# Patient Record
Sex: MALE | Race: WHITE | Employment: FULL TIME
[De-identification: names, ages, dates, MRNs, and addresses within clinical notes are randomized per-mention and may not be internally consistent; named-entity substitution may affect disease eponyms.]

---

## 2017-02-24 ENCOUNTER — OFFICE VISIT (OUTPATIENT)
Dept: FAMILY MEDICINE CLINIC | Facility: CLINIC | Age: 42
End: 2017-02-24

## 2017-02-24 VITALS
HEART RATE: 84 BPM | OXYGEN SATURATION: 100 % | HEIGHT: 72 IN | TEMPERATURE: 98.2 F | SYSTOLIC BLOOD PRESSURE: 145 MMHG | RESPIRATION RATE: 18 BRPM | BODY MASS INDEX: 31.29 KG/M2 | WEIGHT: 231 LBS | DIASTOLIC BLOOD PRESSURE: 100 MMHG

## 2017-02-24 DIAGNOSIS — K44.9 HIATAL HERNIA: ICD-10-CM

## 2017-02-24 DIAGNOSIS — I10 ESSENTIAL HYPERTENSION: Primary | ICD-10-CM

## 2017-02-24 DIAGNOSIS — M79.10 MYALGIA: ICD-10-CM

## 2017-02-24 DIAGNOSIS — Z00.00 HEALTHCARE MAINTENANCE: ICD-10-CM

## 2017-02-24 PROCEDURE — 99213 OFFICE O/P EST LOW 20 MIN: CPT | Performed by: STUDENT IN AN ORGANIZED HEALTH CARE EDUCATION/TRAINING PROGRAM

## 2017-02-24 RX ORDER — HYDROCHLOROTHIAZIDE 25 MG/1
25 TABLET ORAL DAILY
COMMUNITY
End: 2017-02-24 | Stop reason: SDUPTHER

## 2017-02-24 RX ORDER — OMEPRAZOLE 20 MG/1
20 CAPSULE, DELAYED RELEASE ORAL 2 TIMES DAILY
COMMUNITY
End: 2017-02-24 | Stop reason: SDUPTHER

## 2017-02-24 RX ORDER — ACETAMINOPHEN 325 MG/1
325 TABLET ORAL EVERY 6 HOURS PRN
Qty: 120 TABLET | Refills: 3 | Status: SHIPPED | OUTPATIENT
Start: 2017-02-24 | End: 2017-05-19 | Stop reason: ALTCHOICE

## 2017-02-24 RX ORDER — HYDROCHLOROTHIAZIDE 25 MG/1
25 TABLET ORAL DAILY
Qty: 30 TABLET | Refills: 3 | Status: SHIPPED | OUTPATIENT
Start: 2017-02-24 | End: 2017-06-15 | Stop reason: SDUPTHER

## 2017-02-24 RX ORDER — OMEPRAZOLE 20 MG/1
20 CAPSULE, DELAYED RELEASE ORAL 2 TIMES DAILY
Qty: 30 CAPSULE | Refills: 2 | Status: SHIPPED | OUTPATIENT
Start: 2017-02-24 | End: 2018-01-10 | Stop reason: SDUPTHER

## 2017-02-24 ASSESSMENT — PATIENT HEALTH QUESTIONNAIRE - PHQ9
SUM OF ALL RESPONSES TO PHQ QUESTIONS 1-9: 0
2. FEELING DOWN, DEPRESSED OR HOPELESS: 0
SUM OF ALL RESPONSES TO PHQ9 QUESTIONS 1 & 2: 0
1. LITTLE INTEREST OR PLEASURE IN DOING THINGS: 0

## 2017-02-24 ASSESSMENT — ENCOUNTER SYMPTOMS
SHORTNESS OF BREATH: 0
ABDOMINAL PAIN: 0
CONSTIPATION: 0
DIARRHEA: 0
WHEEZING: 0

## 2017-03-15 ENCOUNTER — TELEPHONE (OUTPATIENT)
Dept: FAMILY MEDICINE CLINIC | Age: 42
End: 2017-03-15

## 2017-03-15 ENCOUNTER — EMPLOYEE WELLNESS (OUTPATIENT)
Dept: OTHER | Age: 42
End: 2017-03-15

## 2017-03-15 LAB
CHOLESTEROL/HDL RATIO: 4.9
CHOLESTEROL: 191 MG/DL
GLUCOSE BLD-MCNC: 97 MG/DL (ref 70–99)
HDLC SERPL-MCNC: 39 MG/DL
LDL CHOLESTEROL: 104 MG/DL (ref 0–130)
PATIENT FASTING?: YES
TRIGL SERPL-MCNC: 239 MG/DL
VLDLC SERPL CALC-MCNC: ABNORMAL MG/DL (ref 1–30)

## 2017-03-23 ENCOUNTER — TELEPHONE (OUTPATIENT)
Dept: FAMILY MEDICINE CLINIC | Age: 42
End: 2017-03-23

## 2017-05-15 ENCOUNTER — TELEPHONE (OUTPATIENT)
Dept: FAMILY MEDICINE CLINIC | Age: 42
End: 2017-05-15

## 2017-05-15 NOTE — TELEPHONE ENCOUNTER
Called pt to f/u with him about your message to him about his f/u appt, seen that he have another new patient appt in [facility], just wanted you to know thanks writer.

## 2017-05-19 ENCOUNTER — OFFICE VISIT (OUTPATIENT)
Dept: FAMILY MEDICINE CLINIC | Age: 42
End: 2017-05-19
Payer: COMMERCIAL

## 2017-05-19 VITALS
DIASTOLIC BLOOD PRESSURE: 86 MMHG | BODY MASS INDEX: 33.64 KG/M2 | RESPIRATION RATE: 16 BRPM | SYSTOLIC BLOOD PRESSURE: 135 MMHG | WEIGHT: 235 LBS | HEART RATE: 62 BPM | HEIGHT: 70 IN

## 2017-05-19 DIAGNOSIS — M72.2 PLANTAR FASCIITIS: ICD-10-CM

## 2017-05-19 DIAGNOSIS — E78.1 HIGH TRIGLYCERIDES: ICD-10-CM

## 2017-05-19 DIAGNOSIS — I10 ESSENTIAL HYPERTENSION: Primary | ICD-10-CM

## 2017-05-19 DIAGNOSIS — E78.6 LOW HDL (UNDER 40): ICD-10-CM

## 2017-05-19 DIAGNOSIS — K21.9 GASTROESOPHAGEAL REFLUX DISEASE, ESOPHAGITIS PRESENCE NOT SPECIFIED: ICD-10-CM

## 2017-05-19 PROCEDURE — 99214 OFFICE O/P EST MOD 30 MIN: CPT | Performed by: FAMILY MEDICINE

## 2017-06-15 DIAGNOSIS — I10 ESSENTIAL HYPERTENSION: ICD-10-CM

## 2017-06-15 RX ORDER — HYDROCHLOROTHIAZIDE 25 MG/1
25 TABLET ORAL DAILY
Qty: 30 TABLET | Refills: 5 | Status: SHIPPED | OUTPATIENT
Start: 2017-06-15 | End: 2018-01-10 | Stop reason: SDUPTHER

## 2017-07-25 ENCOUNTER — TELEPHONE (OUTPATIENT)
Dept: PHARMACY | Age: 42
End: 2017-07-25

## 2017-12-26 ENCOUNTER — PATIENT MESSAGE (OUTPATIENT)
Dept: FAMILY MEDICINE CLINIC | Age: 42
End: 2017-12-26

## 2017-12-26 DIAGNOSIS — K21.9 GASTROESOPHAGEAL REFLUX DISEASE, ESOPHAGITIS PRESENCE NOT SPECIFIED: Primary | ICD-10-CM

## 2018-01-08 ENCOUNTER — OFFICE VISIT (OUTPATIENT)
Dept: GASTROENTEROLOGY | Age: 43
End: 2018-01-08
Payer: COMMERCIAL

## 2018-01-08 VITALS
DIASTOLIC BLOOD PRESSURE: 94 MMHG | WEIGHT: 240 LBS | OXYGEN SATURATION: 97 % | RESPIRATION RATE: 14 BRPM | HEIGHT: 72 IN | TEMPERATURE: 97.3 F | BODY MASS INDEX: 32.51 KG/M2 | HEART RATE: 71 BPM | SYSTOLIC BLOOD PRESSURE: 140 MMHG

## 2018-01-08 DIAGNOSIS — Z80.0 FAMILY HISTORY OF COLON CANCER IN MOTHER: ICD-10-CM

## 2018-01-08 DIAGNOSIS — K21.9 GASTROESOPHAGEAL REFLUX DISEASE WITHOUT ESOPHAGITIS: Primary | ICD-10-CM

## 2018-01-08 PROCEDURE — 99244 OFF/OP CNSLTJ NEW/EST MOD 40: CPT | Performed by: INTERNAL MEDICINE

## 2018-01-08 RX ORDER — SODIUM, POTASSIUM,MAG SULFATES 17.5-3.13G
SOLUTION, RECONSTITUTED, ORAL ORAL
Qty: 1 BOTTLE | Refills: 0 | Status: SHIPPED | OUTPATIENT
Start: 2018-01-08 | End: 2018-03-01

## 2018-01-08 ASSESSMENT — ENCOUNTER SYMPTOMS
DIARRHEA: 0
VOMITING: 0
ANAL BLEEDING: 0
RESPIRATORY NEGATIVE: 1
BLOOD IN STOOL: 0
GASTROINTESTINAL NEGATIVE: 1
ABDOMINAL PAIN: 0
ALLERGIC/IMMUNOLOGIC NEGATIVE: 1
RECTAL PAIN: 0
ABDOMINAL DISTENTION: 0
WHEEZING: 0
CONSTIPATION: 0
BACK PAIN: 0
SHORTNESS OF BREATH: 0
NAUSEA: 0
COUGH: 0

## 2018-01-08 NOTE — PROGRESS NOTES
Subjective:      Patient ID: Serjio Gonzalez is a 38 y.o. male. HPI  Dr. Clint Root MD has requested that I see Serjio Gonzalez for a consult for   1. Gastroesophageal reflux disease without esophagitis    2. Family history of colon cancer in mother     . Patient is here for the first time, Gastroesophageal Reflux (pt has history of hiatal hernia and has hx of stomach polyps, he has also aspirated in the past,  doing well recently with watching diet and does not eat late at night ) and GI Problem (pt is adopted but recently found his biological mother's side of the family and found that several family members have had cancer with his mother dying from colon cancer at the age of 43, pt has had 3 colonoscopies in the past starting at the age of 25 and all have been normal. medical records have been requested )  here for the first time   hx of GERD been followed with another GI in his town , eg/colon last time 4 years ago   Was told H H , and gastric polyps from PPI,(MOST [address] )   Very significant F Hx of colon ca in mother and MGM before age 34 , had colonoscopy at age 27-29-38 all negative   Now he moved here and working for [employer] , and wants to start seeing  us   On PPI prilosec BID that is controlling the symptoms well   No N/v   no abd pain   no melena/hematemsis/hematochezia  No dysphagia/odynophagia   no wt loss   no diarrhea /contipation      Past Medical, Family, and Social History reviewed and does contribute to the patient presenting condition. patient\"s PMH/PSH,SH,PSYCH hx, MEDs, ALLERGIES, and ROS was all reviewed and updated ion the appropriate sections    Review of Systems   Constitutional: Negative. Negative for fatigue. HENT: Positive for congestion. Eyes: Positive for visual disturbance (wears glasses). Respiratory: Negative. Negative for cough, shortness of breath and wheezing. Cardiovascular: Negative. Negative for chest pain, palpitations and leg swelling.

## 2018-01-10 DIAGNOSIS — K44.9 HIATAL HERNIA: ICD-10-CM

## 2018-01-10 DIAGNOSIS — I10 ESSENTIAL HYPERTENSION: ICD-10-CM

## 2018-01-10 RX ORDER — HYDROCHLOROTHIAZIDE 25 MG/1
25 TABLET ORAL DAILY
Qty: 30 TABLET | Refills: 0 | Status: SHIPPED | OUTPATIENT
Start: 2018-01-10 | End: 2018-03-01

## 2018-01-10 RX ORDER — OMEPRAZOLE 20 MG/1
20 CAPSULE, DELAYED RELEASE ORAL 2 TIMES DAILY
Qty: 30 CAPSULE | Refills: 0 | Status: SHIPPED | OUTPATIENT
Start: 2018-01-10 | End: 2018-03-01

## 2018-01-10 NOTE — TELEPHONE ENCOUNTER
Please address the medication refill and close the encounter. If I can be of assistance, please route to the applicable pool. Thank you.   Next Visit Date:  Future Appointments  Date Time Provider Remberto Pringle   4/16/2018 8:45 AM [facility]   Topic Date Due    Colon cancer screen colonoscopy  04/20/1991    DTaP/Tdap/Td vaccine (1 - Tdap) 02/24/2018 (Originally 4/20/1992)    Potassium monitoring  03/10/2018    Creatinine monitoring  03/10/2018    Lipid screen  03/15/2022    Flu vaccine  Completed    HIV screen  Completed       No results found for: LABA1C          ( goal A1C is < 7)   No results found for: LABMICR  LDL Cholesterol (mg/dL)   Date Value   03/15/2017 104       (goal LDL is <100)   AST (U/L)   Date Value   03/10/2017 17     ALT (U/L)   Date Value   03/10/2017 18     BUN (mg/dL)   Date Value   03/10/2017 20     BP Readings from Last 3 Encounters:   01/08/18 (!) 140/94   05/19/17 135/86   02/24/17 (!) 145/100          (goal 120/80)    All Future Testing planned in [facility]  Lab Frequency Next Occurrence   Comprehensive Metabolic Panel Once 13/25/4560   Lipid Panel Once 02/27/2018   Vitamin D 1,25 Dihydroxy Once 01/08/2018   EGD Once 01/08/2018   COLONOSCOPY W/ OR W/O BIOPSY Once 01/08/2018               Patient Active Problem List:     Low HDL (under 40)     High triglycerides     Gastroesophageal reflux disease without esophagitis     Family history of colon cancer in mother

## 2018-01-15 DIAGNOSIS — Z80.0 FAMILY HISTORY OF COLON CANCER IN MOTHER: Primary | ICD-10-CM

## 2018-02-01 ENCOUNTER — HOSPITAL ENCOUNTER (OUTPATIENT)
Age: 43
Discharge: HOME | End: 2018-02-01
Payer: COMMERCIAL

## 2018-02-01 DIAGNOSIS — E78.6 LOW HDL (UNDER 40): ICD-10-CM

## 2018-02-01 DIAGNOSIS — E78.1 HIGH TRIGLYCERIDES: ICD-10-CM

## 2018-02-01 DIAGNOSIS — K21.9 GASTROESOPHAGEAL REFLUX DISEASE WITHOUT ESOPHAGITIS: ICD-10-CM

## 2018-02-01 DIAGNOSIS — I10 ESSENTIAL HYPERTENSION: ICD-10-CM

## 2018-02-01 LAB
ALBUMIN SERPL-MCNC: 4.3 G/DL (ref 3.5–5.2)
ALBUMIN/GLOBULIN RATIO: NORMAL (ref 1–2.5)
ALP BLD-CCNC: 57 U/L (ref 40–129)
ALT SERPL-CCNC: 17 U/L (ref 5–41)
ANION GAP SERPL CALCULATED.3IONS-SCNC: 10 MMOL/L (ref 9–17)
AST SERPL-CCNC: 18 U/L
BILIRUB SERPL-MCNC: 0.3 MG/DL (ref 0.3–1.2)
BUN BLDV-MCNC: 15 MG/DL (ref 6–20)
BUN/CREAT BLD: 17 (ref 9–20)
CALCIUM SERPL-MCNC: 9.4 MG/DL (ref 8.6–10.4)
CHLORIDE BLD-SCNC: 102 MMOL/L (ref 98–107)
CHOLESTEROL/HDL RATIO: 4.3
CHOLESTEROL: 170 MG/DL
CO2: 30 MMOL/L (ref 20–31)
CREAT SERPL-MCNC: 0.89 MG/DL (ref 0.7–1.2)
GFR AFRICAN AMERICAN: >60 ML/MIN
GFR NON-AFRICAN AMERICAN: >60 ML/MIN
GFR SERPL CREATININE-BSD FRML MDRD: NORMAL ML/MIN/{1.73_M2}
GFR SERPL CREATININE-BSD FRML MDRD: NORMAL ML/MIN/{1.73_M2}
GLUCOSE BLD-MCNC: 95 MG/DL (ref 70–99)
HDLC SERPL-MCNC: 40 MG/DL
LDL CHOLESTEROL: 106 MG/DL (ref 0–130)
POTASSIUM SERPL-SCNC: 4.1 MMOL/L (ref 3.7–5.3)
SODIUM BLD-SCNC: 142 MMOL/L (ref 135–144)
TOTAL PROTEIN: 7.5 G/DL (ref 6.4–8.3)
TRIGL SERPL-MCNC: 121 MG/DL
VLDLC SERPL CALC-MCNC: ABNORMAL MG/DL (ref 1–30)

## 2018-02-01 PROCEDURE — 80061 LIPID PANEL: CPT

## 2018-02-01 PROCEDURE — 80053 COMPREHEN METABOLIC PANEL: CPT

## 2018-02-01 PROCEDURE — 82652 VIT D 1 25-DIHYDROXY: CPT

## 2018-02-01 PROCEDURE — 36415 COLL VENOUS BLD VENIPUNCTURE: CPT

## 2018-02-03 LAB — VITAMIN D 1,25-DIHYDROXY: 44.4 PG/ML (ref 19.9–79.3)

## 2018-02-05 ENCOUNTER — ANESTHESIA EVENT (OUTPATIENT)
Dept: OPERATING ROOM | Age: 43
End: 2018-02-05
Payer: COMMERCIAL

## 2018-02-06 ENCOUNTER — ANESTHESIA (OUTPATIENT)
Dept: OPERATING ROOM | Age: 43
End: 2018-02-06
Payer: COMMERCIAL

## 2018-02-06 ENCOUNTER — HOSPITAL ENCOUNTER (OUTPATIENT)
Age: 43
Setting detail: OUTPATIENT SURGERY
Discharge: HOME | End: 2018-02-06
Attending: INTERNAL MEDICINE | Admitting: INTERNAL MEDICINE
Payer: COMMERCIAL

## 2018-02-06 VITALS
OXYGEN SATURATION: 97 % | RESPIRATION RATE: 22 BRPM | BODY MASS INDEX: 30.88 KG/M2 | HEIGHT: 72 IN | TEMPERATURE: 97 F | HEART RATE: 80 BPM | WEIGHT: 228 LBS | SYSTOLIC BLOOD PRESSURE: 141 MMHG | DIASTOLIC BLOOD PRESSURE: 90 MMHG

## 2018-02-06 VITALS — SYSTOLIC BLOOD PRESSURE: 128 MMHG | DIASTOLIC BLOOD PRESSURE: 88 MMHG | OXYGEN SATURATION: 91 %

## 2018-02-06 PROCEDURE — 6360000002 HC RX W HCPCS: Performed by: NURSE ANESTHETIST, CERTIFIED REGISTERED

## 2018-02-06 PROCEDURE — 3609010400 HC COLONOSCOPY POLYPECTOMY HOT BIOPSY: Performed by: INTERNAL MEDICINE

## 2018-02-06 PROCEDURE — 3700000001 HC ADD 15 MINUTES (ANESTHESIA): Performed by: INTERNAL MEDICINE

## 2018-02-06 PROCEDURE — 3700000000 HC ANESTHESIA ATTENDED CARE: Performed by: INTERNAL MEDICINE

## 2018-02-06 PROCEDURE — 2580000003 HC RX 258: Performed by: ANESTHESIOLOGY

## 2018-02-06 PROCEDURE — 3609013400 HC EGD REMOVAL LESION(S) BY HOT BIOPSY FORCEPS: Performed by: INTERNAL MEDICINE

## 2018-02-06 PROCEDURE — 6370000000 HC RX 637 (ALT 250 FOR IP): Performed by: INTERNAL MEDICINE

## 2018-02-06 PROCEDURE — 43251 EGD REMOVE LESION SNARE: CPT | Performed by: INTERNAL MEDICINE

## 2018-02-06 PROCEDURE — 7100000011 HC PHASE II RECOVERY - ADDTL 15 MIN: Performed by: INTERNAL MEDICINE

## 2018-02-06 PROCEDURE — 88305 TISSUE EXAM BY PATHOLOGIST: CPT

## 2018-02-06 PROCEDURE — 2580000003 HC RX 258: Performed by: NURSE ANESTHETIST, CERTIFIED REGISTERED

## 2018-02-06 PROCEDURE — 45385 COLONOSCOPY W/LESION REMOVAL: CPT | Performed by: INTERNAL MEDICINE

## 2018-02-06 PROCEDURE — 7100000010 HC PHASE II RECOVERY - FIRST 15 MIN: Performed by: INTERNAL MEDICINE

## 2018-02-06 RX ORDER — SODIUM CHLORIDE, SODIUM LACTATE, POTASSIUM CHLORIDE, CALCIUM CHLORIDE 600; 310; 30; 20 MG/100ML; MG/100ML; MG/100ML; MG/100ML
INJECTION, SOLUTION INTRAVENOUS CONTINUOUS
Status: DISCONTINUED | OUTPATIENT
Start: 2018-02-06 | End: 2018-02-06 | Stop reason: HOSPADM

## 2018-02-06 RX ORDER — SODIUM CHLORIDE, SODIUM LACTATE, POTASSIUM CHLORIDE, CALCIUM CHLORIDE 600; 310; 30; 20 MG/100ML; MG/100ML; MG/100ML; MG/100ML
INJECTION, SOLUTION INTRAVENOUS CONTINUOUS PRN
Status: DISCONTINUED | OUTPATIENT
Start: 2018-02-06 | End: 2018-02-06 | Stop reason: SDUPTHER

## 2018-02-06 RX ORDER — LIDOCAINE HYDROCHLORIDE 10 MG/ML
1 INJECTION, SOLUTION EPIDURAL; INFILTRATION; INTRACAUDAL; PERINEURAL
Status: DISCONTINUED | OUTPATIENT
Start: 2018-02-06 | End: 2018-02-06 | Stop reason: HOSPADM

## 2018-02-06 RX ORDER — MIDAZOLAM HYDROCHLORIDE 1 MG/ML
INJECTION INTRAMUSCULAR; INTRAVENOUS PRN
Status: DISCONTINUED | OUTPATIENT
Start: 2018-02-06 | End: 2018-02-06 | Stop reason: SDUPTHER

## 2018-02-06 RX ORDER — PROPOFOL 10 MG/ML
INJECTION, EMULSION INTRAVENOUS PRN
Status: DISCONTINUED | OUTPATIENT
Start: 2018-02-06 | End: 2018-02-06 | Stop reason: SDUPTHER

## 2018-02-06 RX ORDER — SODIUM CHLORIDE 9 MG/ML
INJECTION, SOLUTION INTRAVENOUS CONTINUOUS
Status: DISCONTINUED | OUTPATIENT
Start: 2018-02-06 | End: 2018-02-06

## 2018-02-06 RX ORDER — SODIUM CHLORIDE 0.9 % (FLUSH) 0.9 %
10 SYRINGE (ML) INJECTION EVERY 12 HOURS SCHEDULED
Status: DISCONTINUED | OUTPATIENT
Start: 2018-02-06 | End: 2018-02-06 | Stop reason: HOSPADM

## 2018-02-06 RX ORDER — SODIUM CHLORIDE 0.9 % (FLUSH) 0.9 %
10 SYRINGE (ML) INJECTION PRN
Status: DISCONTINUED | OUTPATIENT
Start: 2018-02-06 | End: 2018-02-06 | Stop reason: HOSPADM

## 2018-02-06 RX ADMIN — PROPOFOL 30 MG: 10 INJECTION, EMULSION INTRAVENOUS at 09:17

## 2018-02-06 RX ADMIN — MIDAZOLAM HYDROCHLORIDE 2 MG: 1 INJECTION, SOLUTION INTRAMUSCULAR; INTRAVENOUS at 09:09

## 2018-02-06 RX ADMIN — SODIUM CHLORIDE, POTASSIUM CHLORIDE, SODIUM LACTATE AND CALCIUM CHLORIDE: 600; 310; 30; 20 INJECTION, SOLUTION INTRAVENOUS at 08:20

## 2018-02-06 RX ADMIN — PROPOFOL 20 MG: 10 INJECTION, EMULSION INTRAVENOUS at 09:37

## 2018-02-06 RX ADMIN — PROPOFOL 40 MG: 10 INJECTION, EMULSION INTRAVENOUS at 09:24

## 2018-02-06 RX ADMIN — PROPOFOL 50 MG: 10 INJECTION, EMULSION INTRAVENOUS at 09:32

## 2018-02-06 RX ADMIN — PROPOFOL 40 MG: 10 INJECTION, EMULSION INTRAVENOUS at 09:28

## 2018-02-06 RX ADMIN — PROPOFOL 100 MG: 10 INJECTION, EMULSION INTRAVENOUS at 09:13

## 2018-02-06 RX ADMIN — PROPOFOL 70 MG: 10 INJECTION, EMULSION INTRAVENOUS at 09:20

## 2018-02-06 RX ADMIN — PROPOFOL 50 MG: 10 INJECTION, EMULSION INTRAVENOUS at 09:35

## 2018-02-06 RX ADMIN — PROPOFOL 60 MG: 10 INJECTION, EMULSION INTRAVENOUS at 09:18

## 2018-02-06 RX ADMIN — PROPOFOL 70 MG: 10 INJECTION, EMULSION INTRAVENOUS at 09:19

## 2018-02-06 RX ADMIN — PROPOFOL 40 MG: 10 INJECTION, EMULSION INTRAVENOUS at 09:47

## 2018-02-06 RX ADMIN — PROPOFOL 30 MG: 10 INJECTION, EMULSION INTRAVENOUS at 09:43

## 2018-02-06 RX ADMIN — SODIUM CHLORIDE, POTASSIUM CHLORIDE, SODIUM LACTATE AND CALCIUM CHLORIDE: 600; 310; 30; 20 INJECTION, SOLUTION INTRAVENOUS at 09:09

## 2018-02-06 RX ADMIN — PROPOFOL 40 MG: 10 INJECTION, EMULSION INTRAVENOUS at 09:40

## 2018-02-06 RX ADMIN — PROPOFOL 70 MG: 10 INJECTION, EMULSION INTRAVENOUS at 09:15

## 2018-02-06 ASSESSMENT — PULMONARY FUNCTION TESTS
PIF_VALUE: 1
PIF_VALUE: 0
PIF_VALUE: 1
PIF_VALUE: 0
PIF_VALUE: 1
PIF_VALUE: 1
PIF_VALUE: 0
PIF_VALUE: 0
PIF_VALUE: 1
PIF_VALUE: 0
PIF_VALUE: 1
PIF_VALUE: 1
PIF_VALUE: 0
PIF_VALUE: 0
PIF_VALUE: 1
PIF_VALUE: 1
PIF_VALUE: 0
PIF_VALUE: 1
PIF_VALUE: 2
PIF_VALUE: 0
PIF_VALUE: 1

## 2018-02-06 NOTE — H&P
History and Physical Update    Pt Name: Tonia Gonzalez  MRN: [id number]  YOB: 1975  Date of evaluation: 2018      [x] I have reviewed the Office Progress Note found in [facility] dated 18 by Dr. Antonio Eng which meets the criteria for an Interval History and Physical note and is attached below. [x] I have examined  Tonia Gonzalez  There are no changes to the plans for a Colonoscopy and EGD by Dr Antonio Eng for Family Hx colon cancer and GERD . The patient followed the prep as directed until watery yellow clear +Family hx colon cancer in mother  at 43 Several other family member with Hx of colon cancer  No change in bowel habits. He denies health changes, bloody tarry stools, diarrhea alternating with constipation, fever, chills, productive cough, SOB, or unexplained weight loss  No recent Ibuprofen   Last colonoscopy >4 years ago     Vital signs: BP (!) 155/91   Pulse 83   Temp 97.7 °F (36.5 °C) (Oral)   Resp 16   SpO2 98%     Allergies:  Review of patient's allergies indicates no known allergies. Medications:    Prior to Admission medications    Medication Sig Start Date End Date Taking? Authorizing Provider   omeprazole (PRILOSEC) 20 MG delayed release capsule Take 1 capsule by mouth 2 times daily 1/10/18   Venancio Ireland CNP   hydrochlorothiazide (HYDRODIURIL) 25 MG tablet Take 1 tablet by mouth daily 1/10/18   Venancio Ireland CNP   Na Sulfate-K Sulfate-Mg Sulf 17.5-3.13-1.6 GM/180ML SOLN Use as directed in your patient instructions. 18   Tony Moser MD   ibuprofen (ADVIL;MOTRIN) 200 MG tablet Take 200 mg by mouth every 6 hours as needed for Pain    Historical Provider, MD       This is a 38 y.o. male who is pleasant, cooperative, alert and oriented x3, in no acute distress. Heart: Heart sounds are normal.  HR 83 regular rate and rhythm without murmur, gallop or rub.    Lungs: Normal respiratory effort with good air exchange and clear to auscultation without wheezes or rales

## 2018-02-06 NOTE — OP NOTE
PROCEDURE NOTE    DATE OF PROCEDURE: 2/6/2018    SURGEON: Tamiko Ronquillo MD  Facility : [facility] DR VALENCIA GAITAN  ASSISTANT: None  Anesthesia: Mac  PREOPERATIVE DIAGNOSIS:   Family history of colon cancer    POSTOPERATIVE DIAGNOSIS: as described below    OPERATION: Total colonoscopy     ANESTHESIA: Moderate Sedation    ESTIMATED BLOOD LOSS: less than 50     COMPLICATIONS: None. SPECIMENS:  Was Obtained:   1 cm polyp in the right colon snared      HISTORY: The patient is a 38y.o. year old male with history of above preop diagnosis. I recommended colonoscopy with possible biopsy or polypectomy and I explained the risk, benefits, expected outcome, and alternatives to the procedure. Risks included but are not limited to bleeding, infection, respiratory distress, hypotension, and perforation of the colon and possibility of missing a lesion. The patient understands and is in agreement. PROCEDURE: The patient was given IV conscious sedation. The patient's SPO2 remained above 90% throughout the procedure. The colonoscope was inserted per rectum and advanced under direct vision to the cecum without difficulty. Post sedation note : The patient's SPO2 remained above 90% throughout the procedure. the vital signs remained stable , and no immediate complication form the procedure noted, patient will be ready for d/c when criteria is met . The prep was good. Findings:  Terminal ileum: normal    Cecum/Ascending colon: 1 cm polyp in the right colon snared    Transverse colon: normal    Descending/Sigmoid colon: normal    Rectum/Anus: examined in normal and retroflexed positions and was normal    Withdrawal Time was (minutes): 10    The colon was decompressed and the scope was removed. The patient tolerated the procedure well. Recommendations/Plan:   1. Lifestyle and dietary modifications as discussed  2. F/U Biopsies  3. F/U In OfficeYes  4. Discussed with the family  5.  Repeat colonoscopy

## 2018-02-07 LAB — SURGICAL PATHOLOGY REPORT: NORMAL

## 2018-02-09 ENCOUNTER — OFFICE VISIT (OUTPATIENT)
Dept: FAMILY MEDICINE CLINIC | Age: 43
End: 2018-02-09
Payer: COMMERCIAL

## 2018-02-09 VITALS
BODY MASS INDEX: 31.06 KG/M2 | HEART RATE: 77 BPM | SYSTOLIC BLOOD PRESSURE: 145 MMHG | DIASTOLIC BLOOD PRESSURE: 96 MMHG | RESPIRATION RATE: 16 BRPM | WEIGHT: 229 LBS

## 2018-02-09 DIAGNOSIS — Z00.00 ROUTINE GENERAL MEDICAL EXAMINATION AT A HEALTH CARE FACILITY: Primary | ICD-10-CM

## 2018-02-09 DIAGNOSIS — I10 ESSENTIAL HYPERTENSION: ICD-10-CM

## 2018-02-09 DIAGNOSIS — E78.1 HIGH TRIGLYCERIDES: ICD-10-CM

## 2018-02-09 DIAGNOSIS — E78.6 LOW HDL (UNDER 40): ICD-10-CM

## 2018-02-09 DIAGNOSIS — K21.9 GASTROESOPHAGEAL REFLUX DISEASE WITHOUT ESOPHAGITIS: ICD-10-CM

## 2018-02-09 PROCEDURE — 99386 PREV VISIT NEW AGE 40-64: CPT | Performed by: NURSE PRACTITIONER

## 2018-02-09 RX ORDER — LISINOPRIL 10 MG/1
10 TABLET ORAL DAILY
Qty: 30 TABLET | Refills: 5 | Status: SHIPPED | OUTPATIENT
Start: 2018-02-09 | End: 2018-02-09 | Stop reason: SDUPTHER

## 2018-02-09 RX ORDER — LISINOPRIL 10 MG/1
10 TABLET ORAL DAILY
Qty: 30 TABLET | Refills: 5 | Status: SHIPPED | OUTPATIENT
Start: 2018-02-09 | End: 2018-03-29 | Stop reason: SINTOL

## 2018-02-09 NOTE — PATIENT INSTRUCTIONS
Patient Education   Patient Education   Patient Education          lisinopril  Pronunciation:  baron IN oh pril  Brand:  Prinivil, Qbrelis, Zestril  What is the most important information I should know about lisinopril? Do not use lisinopril if you are pregnant. It could harm the unborn baby. Stop using this medicine and tell your doctor right away if you become pregnant. You should not use lisinopril if you have hereditary angioedema. If you have diabetes, do not use lisinopril together with any medication that contains aliskiren (such as Amturnide, Tekturna, Tekamlo). What is lisinopril? Lisinopril is an ACE inhibitor. ACE stands for angiotensin converting enzyme. Lisinopril is used to treat high blood pressure (hypertension) in adults and children who are at least 10years old. Lisinopril is also used to treat congestive heart failure in adults, or to improve survival after a heart attack. Lisinopril may also be used for purposes not listed in this medication guide. What should I discuss with my healthcare provider before taking lisinopril? You should not use this medication if you are allergic to lisinopril or to any other ACE inhibitor, such as benazepril captopril, fosinopril, enalapril, moexipril, perindopril, quinapril, ramipril, or trandolapril. If you have diabetes, do not use lisinopril together with any medication that contains aliskiren (Amturnide, Tekturna, Tekamlo). You may also need to avoid taking lisinopril with aliskiren if you have kidney disease. You should not use lisinopril if you have hereditary angioedema. To make sure lisinopril is safe for you, tell your doctor if you have:  · kidney disease (or if you are on dialysis);  · liver disease;  · diabetes; or  · high levels of potassium in your blood. Do not use if you are pregnant. If you become pregnant, stop taking this medicine and tell your doctor right away.  Lisinopril can cause injury or death to the unborn baby if you take precautions, warnings, drug interactions, allergic reactions, or adverse effects. If you have questions about the drugs you are taking, check with your doctor, nurse or pharmacist.  Copyright 5953-3286 [address]: 13.04. Revision date: 9/19/2016. Care instructions adapted under license by [facility] ([facility]). If you have questions about a medical condition or this instruction, always ask your healthcare professional. [place] [address] any warranty or liability for your use of this information. Eating Healthy Foods: Care Instructions  Your Care Instructions    Eating healthy foods can help lower your risk for disease. Healthy food gives you energy and keeps your heart strong, your brain active, your muscles working, and your bones strong. A healthy diet includes a variety of foods from the basic food groups: grains, vegetables, fruits, milk and milk products, and meat and beans. Some people may eat more of their favorite foods from only one food group and, as a result, miss getting the nutrients they need. So, it is important to pay attention not only to what you eat but also to what you are missing from your diet. You can eat a healthy, balanced diet by making a few small changes. Follow-up care is a key part of your treatment and safety. Be sure to make and go to all appointments, and call your doctor if you are having problems. It's also a good idea to know your test results and keep a list of the medicines you take. How can you care for yourself at home? Look at what you eat  · Keep a food diary for a week or two and record everything you eat or drink. Track the number of servings you eat from each food group. · For a balanced diet every day, eat a variety of:  ¨ 6 or more ounce-equivalents of grains, such as cereals, breads, crackers, rice, or pasta, every day.  An ounce-equivalent is 1 slice of bread, 1 cup of ready-to-eat cereal, or ½ cup of cooked rice, cooked pasta, or cooked cereal.  ¨ 2½ cups of vegetables, especially:  § Dark-green vegetables such as broccoli and spinach. § Orange vegetables such as carrots and sweet potatoes. § Dry beans (such as davidson and kidney beans) and peas (such as lentils). ¨ 2 cups of fresh, frozen, or canned fruit. A small apple or 1 banana or orange equals 1 cup. ¨ 3 cups of nonfat or low-fat milk, yogurt, or other milk products. ¨ 5½ ounces of meat and beans, such as chicken, fish, lean meat, beans, nuts, and seeds. One egg, 1 tablespoon of peanut butter, ½ ounce nuts or seeds, or ¼ cup of cooked beans equals 1 ounce of meat. · Learn how to read food labels for serving sizes and ingredients. Fast-food and convenience-food meals often contain few or no fruits or vegetables. Make sure you eat some fruits and vegetables to make the meal more nutritious. · Look at your food diary. For each food group, add up what you have eaten and then divide the total by the number of days. This will give you an idea of how much you are eating from each food group. See if you can find some ways to change your diet to make it more healthy. Start small  · Do not try to make dramatic changes to your diet all at once. You might feel that you are missing out on your favorite foods and then be more likely to fail. · Start slowly, and gradually change your habits. Try some of the following:  ¨ Use whole wheat bread instead of white bread. ¨ Use nonfat or low-fat milk instead of whole milk. ¨ Eat brown rice instead of white rice, and eat whole wheat pasta instead of white-flour pasta. ¨ Try low-fat cheeses and low-fat yogurt. ¨ Add more fruits and vegetables to meals and have them for snacks. ¨ Add lettuce, tomato, cucumber, and onion to sandwiches. ¨ Add fruit to yogurt and cereal.  Enjoy food  · You can still eat your favorite foods. You just may need to eat less of them.  If your favorite foods are high in fat, salt, and sugar, limit how often you eat them, but do not cut them out entirely. · Eat a wide variety of foods. Make healthy choices when eating out  · The type of restaurant you choose can help you make healthy choices. Even fast-food chains are now offering more low-fat or healthier choices on the menu. · Choose smaller portions, or take half of your meal home. · When eating out, try:  ¨ A veggie pizza with a whole wheat crust or grilled chicken (instead of sausage or pepperoni). ¨ Pasta with roasted vegetables, grilled chicken, or marinara sauce instead of cream sauce. ¨ A vegetable wrap or grilled chicken wrap. ¨ Broiled or poached food instead of fried or breaded items. Make healthy choices easy  · Buy packaged, prewashed, ready-to-eat fresh vegetables and fruits, such as baby carrots, salad mixes, and chopped or shredded broccoli and cauliflower. · Buy packaged, presliced fruits, such as melon or pineapple. · Choose 100% fruit or vegetable juice instead of soda. Limit juice intake to 4 to 6 oz (½ to ¾ cup) a day. · Blend low-fat yogurt, fruit juice, and canned or frozen fruit to make a smoothie for breakfast or a snack. Where can you learn more? Go to https://Step LabsninaLIQVID.Clerts!. org and sign in to your My[employer] account. Enter C641 in the [facility] box to learn more about \"Eating Healthy Foods: Care Instructions. \"     If you do not have an account, please click on the \"Sign Up Now\" link. Current as of: May 12, 2017  Content Version: 11.5  © 8681-8958 [employer]. Care instructions adapted under license by [address] If you have questions about a medical condition or this instruction, always ask your healthcare professional. [place] [address] any warranty or liability for your use of this information. Learning About Physical Activity  What is physical activity? Physical activity is any kind of activity that gets your body moving.   The types of physical activity that can help you get fit and stay healthy include:  · Aerobic or \"cardio\" activities that make your heart beat faster and make you breathe harder, such as brisk walking, riding a bike, or running. Aerobic activities strengthen your heart and lungs and build up your endurance. · Strength training activities that make your muscles work against, or \"resist,\" something, such as lifting weights or doing push-ups. These activities help tone and strengthen your muscles. · Stretches that allow you to move your joints and muscles through their full range of motion. Stretching helps you be more flexible and avoid injury. What are the benefits of physical activity? Being active is one of the best things you can do to get fit and stay healthy. It helps you to:  · Feel stronger and have more energy to do all the things you like to do. · Focus better at school or work and perform better in sports. · Feel, think, and sleep better. · Reach and stay at a healthy weight. · Lose fat and build lean muscle. · Lower your risk for serious health problems. · Keep your bones, muscles, and joints strong. Being fit lets you do more physical activity. And it lets you work out harder without as much effort. How can you make physical activity part of your life? Get at least 30 minutes of exercise on most days of the week. Walking is a good choice. You also may want to do other activities, such as running, swimming, cycling, or playing tennis or team sports. Pick activities that you like-ones that make your heart beat faster, your muscles stronger, and your muscles and joints more flexible. If you find more than one thing you like doing, do them all. You don't have to do the same thing every day. Get your heart pumping every day. Any activity that makes your heart beat faster and keeps it at that rate for a while counts. Here are some great ways to get your heart beating faster:  · Go for a brisk walk, run, or bike ride.   · Go for a hike or swim. · Go in-line skating. · Play a game of touch football, basketball, or soccer. · Ride a bike. · Play tennis or racquetball. · Climb stairs. Even some household chores can be aerobic-just do them at a faster pace. Vacuuming, raking or mowing the lawn, sweeping the garage, and washing and waxing the car all can help get your heart rate up. Strengthen your muscles during the week. You don't have to lift heavy weights or grow big, bulky muscles to get stronger. Doing a few simple activities that make your muscles work against, or \"resist,\" something can help you get stronger. For example, you can:  · Do push-ups or sit-ups, which use your own body weight as resistance. · Lift weights or dumbbells or use stretch bands at home or in a gym or community center. Stretch your muscles often. Stretching will help you as you become more active. It can help you stay flexible, loosen tight muscles, and avoid injury. It can also help improve your balance and posture and can be a great way to relax. Be sure to stretch the muscles you'll be using when you work out. It's best to warm your muscles slightly before you stretch them. Walk or do some other light aerobic activity for a few minutes, and then start stretching. When you stretch your muscles:  · Do it slowly. Stretching is not about going fast or making sudden movements. · Don't push or bounce during a stretch. · Hold each stretch for at least 15 to 30 seconds, if you can. You should feel a stretch in the muscle, but not pain. · Breathe out as you do the stretch. Then breathe in as you hold the stretch. Don't hold your breath. If you're worried about how more activity might affect your health, have a checkup before you start. Follow any special advice your doctor gives you for getting a smart start. Where can you learn more? Go to https://juventino.healthKustomNote. org and sign in to your [employer] account.  Enter T195 in the [facility]

## 2018-02-09 NOTE — PROGRESS NOTES
follow up as documented in this encounter. In regards to his health maintenance continue with regular exercise healthy diet. Continue to monitor. I've encouraged him to send us his physical form to fill out with his recent cholesterol and diabetic screening. 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 Informed her to notify us with the results of this. Low HDL and high triglyceride historycontinue fish oil and his HDL has improved and his triglycerides have decreased. Continue with efforts of cardiovascular activity and healthy diet. Acid refluxcontinue follow gastroneurology he has an appointment scheduled in March. Encouraged lifestyle modifications and discuss numerous different modifications he's going to trial Zantac as needed. Informed to monitor and notify thing concerns. Follow up to 3 weeks for nurse visit BP check if normal return to six-month follow-ups otherwise 3 month follow-up. Tony Olvera received counseling on the following healthy behaviors: nutrition, exercise and medication adherence    Patient given educational materials on general exercise and healthy diet    Was a self-tracking handout given in paper form or via [employer]? No  If yes, see orders or list here. Discussed use, benefit, and side effects of prescribed medications. Barriers to medication compliance addressed. All patient questions answered. Pt voiced understanding.      This note is created with the assistance of a speech-recognition program.  While intending to generate a document that actually reflects the content of the visit, no guarantees can be provided that every mistake has been identified and corrected by editing.

## 2018-02-14 ENCOUNTER — HOSPITAL ENCOUNTER (OUTPATIENT)
Facility: MEDICAL CENTER | Age: 43
End: 2018-02-14
Payer: COMMERCIAL

## 2018-02-15 ENCOUNTER — INITIAL CONSULT (OUTPATIENT)
Dept: ONCOLOGY | Age: 43
End: 2018-02-15
Payer: COMMERCIAL

## 2018-02-15 DIAGNOSIS — Z80.0 FAMILY HISTORY OF COLON CANCER IN MOTHER: ICD-10-CM

## 2018-02-15 PROCEDURE — 96040 PR GENETIC COUNSELING, EACH 30 MIN: CPT | Performed by: GENETIC COUNSELOR, MS

## 2018-02-15 NOTE — PROGRESS NOTES
testing based on his family history of early onset colon cancer. DISCUSSION  We discussed that the Vargas syndrome genes (MLH1, MSH2, MSH6, and PMS2) are the most common genes associated with hereditary colon cancer. We also discussed that genetic testing is available for multiple other genes related to hereditary colon cancer. Some of these genes are known to carry a significant increased risk for several cancers including colon, breast, uterine, ovarian, stomach, and pancreatic cancer, while some of these genes are believed to have a moderate increased risk for breast and other cancers. We discussed the possibility of finding a mutation in genes with limited information to guide medical management, as well we as the possibility of identifying variants of uncertain significance (VUS). We discussed the risks, benefits, and limitations of genetic testing. Possible test results were discussed as well as potential screening and prevention strategies. Specifically, we discussed increased colonoscopy and upper endoscopy screening. We discussed that the results of Mr. Sourav Marrero genetic testing may be beneficial in defining his risk for cancer as well as for his family members. SUMMARY & PLAN  1) Mr. Ingris Ring meets the NCCN criteria for genetic testing based on his family history of colon cancer. 2) Genetic testing for the Vargas syndrome genes as well as additional hereditary cancer genes was offered to Mr. Ingris Ring. 3) We discussed the various laboratory options for genetic testing including the benefits and limitations. Mr. Ingris Ring states that he would prefer to have prior authorization for genetic testing completed before he submits a blood sample. Prior authorization will be submitted for [employer], [employer], and [employer] to determine the lowest out of pocket cost for testing for Mr. Ingris Ring.      4) Mr. Ingris Ring states that he would like to discuss the benefits of genetic testing with

## 2018-03-01 ENCOUNTER — OFFICE VISIT (OUTPATIENT)
Dept: GASTROENTEROLOGY | Age: 43
End: 2018-03-01
Payer: COMMERCIAL

## 2018-03-01 VITALS
WEIGHT: 226 LBS | BODY MASS INDEX: 30.61 KG/M2 | SYSTOLIC BLOOD PRESSURE: 116 MMHG | RESPIRATION RATE: 14 BRPM | HEART RATE: 98 BPM | HEIGHT: 72 IN | DIASTOLIC BLOOD PRESSURE: 84 MMHG | OXYGEN SATURATION: 98 %

## 2018-03-01 DIAGNOSIS — Z80.0 FAMILY HISTORY OF COLON CANCER IN MOTHER: ICD-10-CM

## 2018-03-01 DIAGNOSIS — D12.0 ADENOMA OF CECUM: ICD-10-CM

## 2018-03-01 DIAGNOSIS — K21.9 GASTROESOPHAGEAL REFLUX DISEASE WITHOUT ESOPHAGITIS: Primary | ICD-10-CM

## 2018-03-01 PROCEDURE — 99214 OFFICE O/P EST MOD 30 MIN: CPT | Performed by: INTERNAL MEDICINE

## 2018-03-01 RX ORDER — CHLORAL HYDRATE 500 MG
3000 CAPSULE ORAL 3 TIMES DAILY
COMMUNITY
End: 2021-09-10

## 2018-03-01 ASSESSMENT — ENCOUNTER SYMPTOMS
RESPIRATORY NEGATIVE: 1
CONSTIPATION: 0
BLOOD IN STOOL: 0
GASTROINTESTINAL NEGATIVE: 1
NAUSEA: 0
BACK PAIN: 0
DIARRHEA: 0
RECTAL PAIN: 0
ANAL BLEEDING: 0
COUGH: 0
ABDOMINAL DISTENTION: 0
VOMITING: 0
SHORTNESS OF BREATH: 0
WHEEZING: 0
ALLERGIC/IMMUNOLOGIC NEGATIVE: 1
ABDOMINAL PAIN: 0

## 2018-03-01 NOTE — PROGRESS NOTES
DIAGNOSIS: As described below     OPERATION: Upper GI endoscopy with Biopsy     ANESTHESIA: Moderate Sedation      ESTIMATED BLOOD LOSS: Less than 50 ml     COMPLICATIONS: None.      SPECIMENS:  Was Obtained:      Multiple large polyps in the body of the stomach some of them are as big as 3 cm, snared the 2 large ones and grabbed them with the Constance Servant net for histology  Small hiatal hernia        HISTORY: The patient is a 38y.o. year old male with history of above preop diagnosis. I recommended esophagogastroduodenoscopy with possible biopsy and I explained the risk, benefits, expected outcome, and alternatives to the procedure. Risks included but are not limited to bleeding, infection, respiratory distress, hypotension, and perforation of the esophagus, stomach, or duodenum. Patient understands and is in agreement.     PROCEDURE: The patient was given IV conscious sedation. The patient's SPO2 remained above 90% throughout the procedure. The gastroscope was inserted orally and advanced under direct vision through the esophagus, through the stomach, through the pylorus, and into the descending duodenum.       Post sedation note : The patient's SPO2 remained above 90% throughout the procedure. the vital signs remained stable , and no immediate complication form the procedure noted, patient will be ready for d/c when criteria is met .        Findings:     Retropharyngeal area was grossly normal appearing     Esophagus: abnormal:  Small hiatal hernia        Stomach:    Fundus: normal    Body: abnormal:  Multiple large polyps in the body of the stomach some of them are as big as 3 cm, snared the 2 large ones and grabbed them with the Anand net for histology    Antrum: abnormal: Erythema and edema     Duodenum:     Descending: normal    Bulb: normal     The scope was removed and the patient tolerated the procedure well.      Recommendations/Plan:   1. F/U Biopsies  2. F/U In Office in 3-4 weeks  3.  Discussed with the family     Electronically signed by Carmen He MD  on 2/6/2018 at 9:51 AM       Diagnosis --   1.  GASTRIC POLYPS, BIOPSIES:  FUNDIC GLAND POLYPS. 2.  RIGHT COLON POLYP, BIOPSY:  ADENOMA. JOSE Danielle   **Electronically Signed Out**         niecy/2/7/2018      Review of Systems   Constitutional: Negative. Negative for fatigue. HENT: Positive for congestion. Eyes: Positive for visual disturbance (wears glasses). Respiratory: Negative. Negative for cough, shortness of breath and wheezing. Cardiovascular: Negative. Negative for chest pain, palpitations and leg swelling. Gastrointestinal: Negative. Negative for abdominal distention, abdominal pain, anal bleeding, blood in stool, constipation, diarrhea, nausea, rectal pain and vomiting. Endocrine: Negative. Genitourinary: Negative. Negative for difficulty urinating. Musculoskeletal: Negative for back pain and gait problem. Skin: Negative. Allergic/Immunologic: Negative. Negative for environmental allergies and food allergies. Neurological: Negative. Negative for dizziness, tremors, weakness, light-headedness, numbness and headaches. Hematological: Negative. Does not bruise/bleed easily. Psychiatric/Behavioral: Negative. The patient is not nervous/anxious. Objective:   Physical Exam   Constitutional: He is oriented to person, place, and time. He appears well-developed and well-nourished. No distress. HENT:   Head: Normocephalic and atraumatic. Mouth/Throat: Oropharynx is clear and moist.   Eyes: Pupils are equal, round, and reactive to light. No scleral icterus. Neck: Normal range of motion. Neck supple. No JVD present. No tracheal deviation present. No thyromegaly present. Cardiovascular: Normal rate, regular rhythm, normal heart sounds and intact distal pulses. No murmur heard. Pulmonary/Chest: Effort normal and breath sounds normal. No respiratory distress. He has no wheezes.    Abdominal:

## 2018-03-16 ENCOUNTER — PATIENT MESSAGE (OUTPATIENT)
Dept: FAMILY MEDICINE CLINIC | Age: 43
End: 2018-03-16

## 2018-03-16 NOTE — TELEPHONE ENCOUNTER
From: Regina Garber  To: Ida Cueva CNP  Sent: 3/16/2018 8:42 AM EDT  Subject: Prescription Question    So Lisinopril has been working great. Last BP was 116/84. Only issue, I have a persistent dry cough. Is there anything else that I can take that doesn't cause the side affect of the cough?

## 2018-03-20 VITALS — BODY MASS INDEX: 31.19 KG/M2 | WEIGHT: 230 LBS

## 2018-03-28 ENCOUNTER — PATIENT MESSAGE (OUTPATIENT)
Dept: FAMILY MEDICINE CLINIC | Age: 43
End: 2018-03-28

## 2018-03-28 DIAGNOSIS — I10 ESSENTIAL HYPERTENSION: Primary | ICD-10-CM

## 2018-03-28 NOTE — TELEPHONE ENCOUNTER
From: Frederick Hwang  To: Amelia Cruz CNP  Sent: 3/28/2018 3:47 PM EDT  Subject: Prescription Question    I spoke with the pharmacist at [facility], she is going to msg you. I thought I was ok to switch meds and there was nothing in my file. I do wish to switch    ----- Message -----  From: Amelia Cruz CNP  Sent: 3/16/18, 8:49 AM  To: Frederick Hwang  Subject: RE: Prescription Question    Almradha Palaciosugh    We really should discontinue the lisinopril if you are experiencing a dry cough. We would place you on a medication called losartan. Let me know your thoughts and I'll call this medication in for you. Amelia Cruz CNP     losartan  Pronunciation: zoe SHELBY tan  Brand: Cozaar  What is the most important information I should know about losartan? Do not use if you are pregnant. If you become pregnant, stop taking this medicine and tell your doctor right away. Losartan can cause injury or death to the unborn baby if you take the medicine during your second or third trimester. If you have diabetes, do not use losartan together with any medication that contains aliskiren (Amturnide, Tekturna, Tekamlo). What is losartan? Losartan is an angiotensin II receptor antagonist. Losartan keeps blood vessels from narrowing, which lowers blood pressure and improves blood flow. Losartan is used to treat high blood pressure (hypertension) in adults and children who are at least 10years old. It is also used to lower the risk of stroke in certain people with heart disease. Losartan is used to slow long-term kidney damage in people with type 2 diabetes who also have high blood pressure. Losartan may also be used for purposes not listed in this medication guide. What should I discuss with my healthcare provider before taking losartan? You should not use losartan if you are allergic to it.   If you have diabetes, do not use losartan together with any medication that contains aliskiren (Amturnide, Tekturna, Rudy). You may also need to avoid taking losartan with aliskiren if you have kidney disease. Do not use if you are pregnant. If you become pregnant, stop taking this medicine and tell your doctor right away. Losartan can cause injury or death to the unborn baby if you take the medicine during your second or third trimester. To make sure losartan is safe for you, tell your doctor if you have:  · kidney disease;  · liver disease;  · congestive heart failure;  · an electrolyte imbalance (such as high levels of potassium in your blood);  · if you are on a low-salt diet; or  · if you are dehydrated. It is not known whether losartan passes into breast milk or if it could harm a nursing baby. You should not breast-feed while using this medicine. Losartan is not approved for use by anyone younger than 10years old. How should I take losartan? Follow all directions on your prescription label. Your doctor may occasionally change your dose to make sure you get the best results. Do not take this medicine in larger or smaller amounts or for longer than recommended. You may take losartan with or without food. Call your doctor if you have ongoing vomiting or diarrhea, or if you are sweating more than usual. You can easily become dehydrated while taking this medication, which can lead to severely low blood pressure or a serious electrolyte imbalance. Your blood pressure will need to be checked often. You may need other blood and urine tests at your doctor's office. It may take 3 to 6 weeks of using this medicine before your blood pressure is under control. For best results, keep using the medication as directed. Talk with your doctor if your condition does not improve after 3 weeks of treatment. If you are being treated for high blood pressure, keep using this medication even if you feel well. High blood pressure often has no symptoms. You may need to use blood pressure medication for the rest of your life.   Store at pressure medications;  · lithium;  · celecoxib; or  · aspirin or other NSAIDs (nonsteroidal anti-inflammatory drugs) such as ibuprofen (Advil, Motrin), naproxen (Aleve), celecoxib, diclofenac, indomethacin, meloxicam, and others. This list is not complete. Other drugs may interact with losartan, including prescription and over-the-counter medicines, vitamins, and herbal products. Not all possible interactions are listed in this medication guide. Where can I get more information? Your pharmacist can provide more information about losartan.        Remember, keep this and all other medicines out of the reach of children, never share your medicines with others, and use this medication only for the indication prescribed. Every effort has been made to ensure that the information provided by [facility] is accurate, up-to-date, and complete, but no guarantee is made to that effect. Drug information contained herein may be time sensitive. [facility] information has been compiled for use by healthcare practitioners and consumers in the [place] and therefore [facility] does not warrant that uses outside of the [place] are appropriate, unless specifically indicated otherwise.  [facility]'s drug information does not endorse drugs, diagnose patients or re

## 2018-03-29 RX ORDER — LOSARTAN POTASSIUM 25 MG/1
25 TABLET ORAL DAILY
Qty: 30 TABLET | Refills: 3 | Status: SHIPPED | OUTPATIENT
Start: 2018-03-29 | End: 2018-07-19 | Stop reason: SDUPTHER

## 2018-04-17 ENCOUNTER — EMPLOYEE WELLNESS (OUTPATIENT)
Dept: OTHER | Age: 43
End: 2018-04-17

## 2018-04-17 LAB
CHOLESTEROL/HDL RATIO: 3.8
CHOLESTEROL: 173 MG/DL
GLUCOSE BLD-MCNC: 93 MG/DL (ref 70–99)
HDLC SERPL-MCNC: 46 MG/DL
LDL CHOLESTEROL: 103 MG/DL (ref 0–130)
PATIENT FASTING?: YES
TRIGL SERPL-MCNC: 119 MG/DL
VLDLC SERPL CALC-MCNC: NORMAL MG/DL (ref 1–30)

## 2018-04-23 VITALS — WEIGHT: 223 LBS | BODY MASS INDEX: 30.24 KG/M2

## 2018-06-18 DIAGNOSIS — Z80.0 FAMILY HISTORY OF COLON CANCER IN MOTHER: ICD-10-CM

## 2018-07-19 DIAGNOSIS — I10 ESSENTIAL HYPERTENSION: ICD-10-CM

## 2018-07-19 RX ORDER — LOSARTAN POTASSIUM 25 MG/1
TABLET ORAL
Qty: 30 TABLET | Refills: 3 | Status: SHIPPED | OUTPATIENT
Start: 2018-07-19 | End: 2018-08-21 | Stop reason: SDUPTHER

## 2018-08-21 ENCOUNTER — OFFICE VISIT (OUTPATIENT)
Dept: PRIMARY CARE CLINIC | Age: 43
End: 2018-08-21
Payer: COMMERCIAL

## 2018-08-21 VITALS
WEIGHT: 224.6 LBS | BODY MASS INDEX: 30.42 KG/M2 | DIASTOLIC BLOOD PRESSURE: 94 MMHG | OXYGEN SATURATION: 100 % | RESPIRATION RATE: 16 BRPM | SYSTOLIC BLOOD PRESSURE: 145 MMHG | HEIGHT: 72 IN | HEART RATE: 78 BPM

## 2018-08-21 DIAGNOSIS — I10 ESSENTIAL HYPERTENSION: Primary | ICD-10-CM

## 2018-08-21 DIAGNOSIS — F43.9 STRESS: ICD-10-CM

## 2018-08-21 DIAGNOSIS — E78.6 LOW HDL (UNDER 40): ICD-10-CM

## 2018-08-21 DIAGNOSIS — D12.0 ADENOMA OF CECUM: ICD-10-CM

## 2018-08-21 DIAGNOSIS — K21.9 GASTROESOPHAGEAL REFLUX DISEASE WITHOUT ESOPHAGITIS: ICD-10-CM

## 2018-08-21 PROCEDURE — 99214 OFFICE O/P EST MOD 30 MIN: CPT | Performed by: NURSE PRACTITIONER

## 2018-08-21 RX ORDER — LOSARTAN POTASSIUM 50 MG/1
50 TABLET ORAL DAILY
Qty: 30 TABLET | Refills: 2 | Status: SHIPPED | OUTPATIENT
Start: 2018-08-21 | End: 2018-09-11 | Stop reason: SDUPTHER

## 2018-08-21 RX ORDER — RANITIDINE 150 MG/1
150 TABLET ORAL 2 TIMES DAILY
Status: ON HOLD | COMMUNITY
End: 2021-03-05

## 2018-08-21 ASSESSMENT — PATIENT HEALTH QUESTIONNAIRE - PHQ9
SUM OF ALL RESPONSES TO PHQ QUESTIONS 1-9: 0
1. LITTLE INTEREST OR PLEASURE IN DOING THINGS: 0
SUM OF ALL RESPONSES TO PHQ9 QUESTIONS 1 & 2: 0
2. FEELING DOWN, DEPRESSED OR HOPELESS: 0
SUM OF ALL RESPONSES TO PHQ QUESTIONS 1-9: 0

## 2018-08-21 NOTE — PROGRESS NOTES
Visit Information    Have you changed or started any medications since your last visit including any over-the-counter medicines, vitamins, or herbal medicines? no   Have you stopped taking any of your medications? Is so, why? -  no  Are you having any side effects from any of your medications? - no    Have you seen any other physician or provider since your last visit? yes - gastro    Have you had any other diagnostic tests since your last visit?  no   Have you been seen in the emergency room and/or had an admission in a hospital since we last saw you?  no   Have you had your routine dental cleaning in the past 6 months?  yes -      Do you have an active MyChart account? If no, what is the barrier?   Yes    Patient Care Team:  Vadim Mays MD as PCP - General (Family Medicine)  DIANA Chou CNP as PCP - [facility] Attributed Provider    Medical History Review  Past Medical, Family, and Social History reviewed and does contribute to the patient presenting condition    Health Maintenance   Topic Date Due    DTaP/Tdap/Td vaccine (1 - Tdap) 04/20/1992    Flu vaccine (1) 09/01/2018    Potassium monitoring  02/01/2019    Creatinine monitoring  02/01/2019    Colon cancer screen colonoscopy  02/06/2021    Lipid screen  04/17/2023    HIV screen  Completed

## 2018-08-21 NOTE — PATIENT INSTRUCTIONS
Patient Education   Patient Education          losartan  Pronunciation:  zoe SHELBY tan  Brand:  Cozaar  What is the most important information I should know about losartan? Do not use if you are pregnant. If you become pregnant, stop taking this medicine and tell your doctor right away. Losartan can cause injury or death to the unborn baby if you take the medicine during your second or third trimester. If you have diabetes, do not use losartan together with any medication that contains aliskiren (Amturnide, Tekturna, Tekamlo). What is losartan? Losartan is an angiotensin II receptor antagonist. Losartan keeps blood vessels from narrowing, which lowers blood pressure and improves blood flow. Losartan is used to treat high blood pressure (hypertension) in adults and children who are at least 10years old. It is also used to lower the risk of stroke in certain people with heart disease. Losartan is used to slow long-term kidney damage in people with type 2 diabetes who also have high blood pressure. Losartan may also be used for purposes not listed in this medication guide. What should I discuss with my healthcare provider before taking losartan? You should not use losartan if you are allergic to it. If you have diabetes, do not use losartan together with any medication that contains aliskiren (Amturnide, Tekturna, Tekamlo). You may also need to avoid taking losartan with aliskiren if you have kidney disease. Do not use if you are pregnant. If you become pregnant, stop taking this medicine and tell your doctor right away. Losartan can cause injury or death to the unborn baby if you take the medicine during your second or third trimester.   To make sure losartan is safe for you, tell your doctor if you have:  · kidney disease;  · liver disease;  · congestive heart failure;  · an electrolyte imbalance (such as high levels of potassium in your blood);  · if you are on a low-salt diet; or  · if you are dehydrated. It is not known whether losartan passes into breast milk or if it could harm a nursing baby. You should not breast-feed while using this medicine. Losartan is not approved for use by anyone younger than 10years old. How should I take losartan? Follow all directions on your prescription label. Your doctor may occasionally change your dose to make sure you get the best results. Do not take this medicine in larger or smaller amounts or for longer than recommended. You may take losartan with or without food. Call your doctor if you have ongoing vomiting or diarrhea, or if you are sweating more than usual. You can easily become dehydrated while taking this medication, which can lead to severely low blood pressure or a serious electrolyte imbalance. Your blood pressure will need to be checked often. You may need other blood and urine tests at your doctor's office. It may take 3 to 6 weeks of using this medicine before your blood pressure is under control. For best results, keep using the medication as directed. Talk with your doctor if your condition does not improve after 3 weeks of treatment. If you are being treated for high blood pressure, keep using this medication even if you feel well. High blood pressure often has no symptoms. You may need to use blood pressure medication for the rest of your life. Store at room temperature away from moisture, heat, and light. What happens if I miss a dose? Take the missed dose as soon as you remember. Skip the missed dose if it is almost time for your next scheduled dose. Do not  take extra medicine to make up the missed dose. What happens if I overdose? Seek emergency medical attention or call the Poison Help line at 1-914.383.7120. What should I avoid while taking losartan? Drinking alcohol can further lower your blood pressure and may increase certain side effects of losartan.   Do not use potassium supplements or salt substitutes while you are out of the reach of children, never share your medicines with others, and use this medication only for the indication prescribed. Every effort has been made to ensure that the information provided by [facility] [address] is accurate, up-to-date, and complete, but no guarantee is made to that effect. Drug information contained herein may be time sensitive. [facility] information has been compiled for use by healthcare practitioners and consumers in the [place] and therefore [facility] does not warrant that uses outside of the [place] are appropriate, unless specifically indicated otherwise. [facility]'s drug information does not endorse drugs, diagnose patients or recommend therapy. [facility]'s drug information is an informational resource designed to assist licensed healthcare practitioners in caring for their patients and/or to serve consumers viewing this service as a supplement to, and not a substitute for, the expertise, skill, knowledge and judgment of healthcare practitioners. The absence of a warning for a given drug or drug combination in no way should be construed to indicate that the drug or drug combination is safe, effective or appropriate for any given patient. [facility] does not assume any responsibility for any aspect of healthcare administered with the aid of information [facility] provides. The information contained herein is not intended to cover all possible uses, directions, precautions, warnings, drug interactions, allergic reactions, or adverse effects. If you have questions about the drugs you are taking, check with your doctor, nurse or pharmacist.  Copyright 4577-9281 [address]. Version: 14.01. Revision date: 5/6/2016. Care instructions adapted under license by [facility] ([facility]). If you have questions about a medical condition or this instruction, always ask your healthcare professional. [place] [address] any warranty or liability for your use of this information.

## 2018-08-21 NOTE — PROGRESS NOTES
Subjective:  Phil Ballesteros is in for continued evaluation and management. His chronic medical problems include the following; hypertension, dyslipidemia, and GERD. He has history of hypertension. He is currently on losartan 25 mg daily he denies any intolerances or adverse effects associated usage of this. 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 He has to have this rechecked in 3 months. He is not regularly checking  his blood pressure. Also wondered if his increased stress recently, causing his blood pressure be elevated he is having some issues with his ex-wife. He denies suicidal/homicidal ideations or plans. He does state at times he has been having some increased stress. Denies chest pain or pressure, shortness of breath, frequent headaches, vision changes. History dyslipidemia with a low HDL and elevated triglycerides he has not been in a statin benefit group he takes Fish oil supplement. He's been exercising regulate heavy healthy. He denies chest pain or pressure does not smoke tobacco.    A history of GERD he currently takes Zantac daily he states the most part this helps his symptoms. He states intermittently he'll take omeprazole but not a regular basis. He's had an EGD he is also follow up with gastroenterology had colonoscopy due to family history of colon cancer. Polyp removed. He'll be due to have a repeat colonoscopy in 3 years. He denies current abdominal pain heartburn rectal bleeding or unintentional weight loss. Review of systems per HPI, otherwise negative. Allergies; medications; past medical, surgical, family, and social history; and problem list reviewed as indicated in this encounter. Objective:  Vitals: Blood pressure (!) 145/94, pulse 78, resp. rate 16, height 6' 0.01\" (1.829 m), weight 224 lb 9.6 oz (101.9 kg), SpO2 100 %. Constitutional: He is oriented to person, place, and time.   He appears well-developed and well-nourished and in no acute distress. Cardiovascular: Normal rate and regular rhythm, no murmur, rub, or gallop    Pulmonary/Chest: Effort normal and breath sounds normal. No rales or wheezes. No chest retraction. Abdomen: soft nontender  Extremities: no clubbing, cyanosis, or edema  Neurological:  CN II - XII grossly intact; no focal neurological deficits  Psychiatric:  Well groomed, well dressed. The patient maintains appropriate eye contact and does not appear to be responding to internal stimuli. No agitation    Lab Results   Component Value Date    CHOL 173 04/17/2018    CHOL 170 02/01/2018    CHOL 191 03/15/2017     Lab Results   Component Value Date    TRIG 119 04/17/2018    TRIG 121 02/01/2018    TRIG 239 (H) 03/15/2017     Lab Results   Component Value Date    HDL 46 04/17/2018    HDL 40 (L) 02/01/2018    HDL 39 (L) 03/15/2017     Lab Results   Component Value Date    LDLCHOLESTEROL 103 04/17/2018    LDLCHOLESTEROL 106 02/01/2018    LDLCHOLESTEROL 104 03/15/2017     Lab Results   Component Value Date    VLDL NOT REPORTED 04/17/2018    VLDL NOT REPORTED 02/01/2018    VLDL NOT REPORTED 03/15/2017     Lab Results   Component Value Date    CHOLHDLRATIO 3.8 04/17/2018    CHOLHDLRATIO 4.3 02/01/2018    CHOLHDLRATIO 4.9 03/15/2017     Lab Results   Component Value Date     02/01/2018    K 4.1 02/01/2018     02/01/2018    CO2 30 02/01/2018    BUN 15 02/01/2018    CREATININE 0.89 02/01/2018    GLUCOSE 93 04/17/2018    CALCIUM 9.4 02/01/2018    PROT 7.5 02/01/2018    LABALBU 4.3 02/01/2018    BILITOT 0.30 02/01/2018    ALKPHOS 57 02/01/2018    AST 18 02/01/2018    ALT 17 02/01/2018    LABGLOM >60 02/01/2018    GFRAA >60 02/01/2018           Assessment/ Plan / Medical Decision Making   Diagnosis Orders   1. Essential hypertension  losartan (COZAAR) 50 MG tablet   2. Stress     3. Low HDL (under 40)     4. Gastroesophageal reflux disease without esophagitis     5.  Adenoma of cecum             Medications, laboratory testing, imaging, consultation, and follow up as documented in this encounter. Hypertensionhis blood pressure is elevated I like to increase his losartan to 50 mg daily he is asymptomatic with this. He is concerned about his stress may be part of the causes. We had long conversation about trial of Zoloft little T somewhat hesitant to do this at this time. I provided information regards to this if he would like to discuss this and possibly referral to a therapist that he would like. He'll let us know. Low HDL and elevated triglycerideshis most recent lipid panel has improved in lifestyle medications continue fish oil continue to monitor. GERD and colon polypcontinue follow gastroenterology continue Zantac as prescribed continue to monitor. Follow up 4 weeks for hypertension and stress, sooner if needed. Fany Goyal received counseling on the following healthy behaviors: nutrition, exercise and medication adherence    Patient given educational materials on losartan and zoloft    Was a self-tracking handout given in paper form or via [employer]? No  If yes, see orders or list here. Discussed use, benefit, and side effects of prescribed medications. Barriers to medication compliance addressed. All patient questions answered. Pt voiced understanding. This note is created with the assistance of a speech-recognition program.  While intending to generate a document that actually reflects the content of the visit, no guarantees can be provided that every mistake has been identified and corrected by editing. Of the 25 minute duration appointment visit, Saulo Yi CNP spent at least 50% of the face-to-face time in counseling, explanation of diagnosis, planning of further management, and answering all questions.

## 2018-09-03 ENCOUNTER — PATIENT MESSAGE (OUTPATIENT)
Dept: PRIMARY CARE CLINIC | Age: 43
End: 2018-09-03

## 2018-09-04 NOTE — TELEPHONE ENCOUNTER
From: Joao Mojica  To: DIANA Pitts - CNP  Sent: 9/3/2018 11:43 AM EDT  Subject: Visit Follow-Up Question    Have a question. How long should it take to start seeing results on uping my Losartan? I've been on it for 1 1/2 weeks and BP as of today is around 145/90. I purchased a BP cuff for home. ...

## 2018-09-11 ENCOUNTER — PATIENT MESSAGE (OUTPATIENT)
Dept: PRIMARY CARE CLINIC | Age: 43
End: 2018-09-11

## 2018-09-11 DIAGNOSIS — I10 ESSENTIAL HYPERTENSION: ICD-10-CM

## 2018-09-11 RX ORDER — LOSARTAN POTASSIUM 100 MG/1
100 TABLET ORAL DAILY
Qty: 30 TABLET | Refills: 1 | Status: SHIPPED | OUTPATIENT
Start: 2018-09-11 | End: 2018-09-28 | Stop reason: ALTCHOICE

## 2018-09-11 NOTE — TELEPHONE ENCOUNTER
From: Joellen Woodall  To: DIANA Trejo CNP  Sent: 9/11/2018 2:47 PM EDT  Subject: RE: Visit Follow-Up Question    Should I just double what I have for right now? I believe you prescribe me 50 mg correct?  ----- Message -----  From: DIANA Trejo CNP  Sent: 9/11/18, 11:12 AM  To: Joellen Woodall  Subject: RE: Visit Follow-Up Question    Racine Dave    I think we should increase your losartan(cozaar) to 100mg, I will call in increased dosage and schedule appointment with me in 2-3 weeks to evaluate your blood pressure. Cleveland Iverson CNP      ----- Message -----   From: Joellen Woodall   Sent: 9/11/2018 9:48 AM EDT   To: DIANA Trejo CNP  Subject: Visit Follow-Up Question    BP was 145/90 then 145/80 today 150/110, I did have a little bit of stress this morning so that might be part of that, Any suggestions what we should do?  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

## 2018-09-28 ENCOUNTER — OFFICE VISIT (OUTPATIENT)
Dept: PRIMARY CARE CLINIC | Age: 43
End: 2018-09-28
Payer: COMMERCIAL

## 2018-09-28 VITALS
HEART RATE: 78 BPM | OXYGEN SATURATION: 99 % | WEIGHT: 222.6 LBS | DIASTOLIC BLOOD PRESSURE: 96 MMHG | BODY MASS INDEX: 30.15 KG/M2 | TEMPERATURE: 97.8 F | SYSTOLIC BLOOD PRESSURE: 138 MMHG | RESPIRATION RATE: 16 BRPM | HEIGHT: 72 IN

## 2018-09-28 DIAGNOSIS — I10 ESSENTIAL HYPERTENSION: Primary | ICD-10-CM

## 2018-09-28 DIAGNOSIS — M79.671 RIGHT FOOT PAIN: ICD-10-CM

## 2018-09-28 DIAGNOSIS — F41.9 ANXIETY: ICD-10-CM

## 2018-09-28 DIAGNOSIS — F43.9 STRESS: ICD-10-CM

## 2018-09-28 PROCEDURE — 99214 OFFICE O/P EST MOD 30 MIN: CPT | Performed by: NURSE PRACTITIONER

## 2018-09-28 RX ORDER — FLUOXETINE 10 MG/1
10 CAPSULE ORAL DAILY
Qty: 30 CAPSULE | Refills: 3 | Status: ON HOLD | OUTPATIENT
Start: 2018-09-28 | End: 2021-03-05

## 2018-09-28 RX ORDER — LOSARTAN POTASSIUM AND HYDROCHLOROTHIAZIDE 25; 100 MG/1; MG/1
1 TABLET ORAL DAILY
Qty: 30 TABLET | Refills: 3 | Status: SHIPPED | OUTPATIENT
Start: 2018-09-28 | End: 2018-12-28 | Stop reason: SDUPTHER

## 2018-09-28 NOTE — PROGRESS NOTES
Subjective:  Brandon Buchanan is in for continued evaluation and management. His chronic medical problems include the following; HTN, anxiety/stress and right foot pain. Brandon Buchanan has a history of hypertension we have been monitoring his blood pressure we've increased his losartan 100 mg. He denies any intolerances or adverse effects associated with this. He is concerned some of his stress very causing part of the issue with his blood pressure. He denies chest pain or pressure or shortness of breath. Frequent headaches or leg swelling. Having some stressful issues in regards to his ex-wife. He hasn't tried couple this on his own. He denies any suicidal/homicidal ideations or plans. He is additionally complaining of right foot pain is present intermittently for the past month progressively getting worse. It is located the bottom plantar aspect of his heel and foot. He's had this previously dictated history plantar fasciitis. He states he knows he's roughly due for new shoes. He denies any known injury. Denies numbness or tingling at this time. Denies decreased range of motion swelling. She has been taking Motrin which seems to help. Also been icing at night which also helps. Review of systems per HPI, otherwise negative. Allergies; medications; past medical, surgical, family, and social history; and problem list reviewed as indicated in this encounter. Objective:  Vitals: Blood pressure (!) 138/96, pulse 78, temperature 97.8 °F (36.6 °C), temperature source Oral, resp. rate 16, height 6' (1.829 m), weight 222 lb 9.6 oz (101 kg), SpO2 99 %. Constitutional: He is oriented to person, place, and time. He appears well-developed and well-nourished and in no acute distress. Cardiovascular: Normal rate and regular rhythm, no murmur, rub, or gallop    Pulmonary/Chest: Effort normal and breath sounds normal. No rales or wheezes. No chest retraction.   Skeletal skeletal: Right footno acute swelling or for anxiety, foot pain and hypertension sooner if needed. Robyn Castle received counseling on the following healthy behaviors: nutrition, exercise and medication adherence    Patient given educational materials on Hyzaar, prozac, and plantar fasciitis stretches    Was a self-tracking handout given in paper form or via [facility]? No  If yes, see orders or list here. Discussed use, benefit, and side effects of prescribed medications. Barriers to medication compliance addressed. All patient questions answered. Pt voiced understanding. This note is created with the assistance of a speech-recognition program.  While intending to generate a document that actually reflects the content of the visit, no guarantees can be provided that every mistake has been identified and corrected by editing. Of the 25 minute duration appointment visit, Christopher Sloan CNP spent at least 50% of the face-to-face time in counseling, explanation of diagnosis, planning of further management, and answering all questions.

## 2018-09-28 NOTE — PATIENT INSTRUCTIONS
are taking losartan, unless your doctor has told you to. Avoid getting up too fast from a sitting or lying position, or you may feel dizzy. Get up slowly and steady yourself to prevent a fall. What are the possible side effects of losartan? Get emergency medical help if you have signs of an allergic reaction: hives; difficult breathing; swelling of your face, lips, tongue, or throat. Call your doctor at once if you have:  · a light-headed feeling, like you might pass out;  · pain or burning when you urinate;  · pale skin, feeling light-headed or short of breath, rapid heart rate, trouble concentrating;  · chest pain;  · high potassium --nausea, slow or unusual heart rate, weakness, loss of movement; or  · kidney problems --little or no urination, painful or difficult urination, swelling in your feet or ankles, feeling tired or short of breath. Common side effects may include:  · dizziness;  · dry cough;  · back pain; or  · cold symptoms such as stuffy nose, sneezing, sore throat. This is not a complete list of side effects and others may occur. Call your doctor for medical advice about side effects. You may report side effects to FDA at 3-808-FDA-5267. What other drugs will affect losartan? Tell your doctor about all your current medicines and any you start or stop using, especially:  · a diuretic or \"water pill\";  · other blood pressure medications;  · lithium;  · celecoxib; or  · aspirin or other NSAIDs (nonsteroidal anti-inflammatory drugs) such as ibuprofen (Advil, Motrin), naproxen (Aleve), celecoxib, diclofenac, indomethacin, meloxicam, and others. This list is not complete. Other drugs may interact with losartan, including prescription and over-the-counter medicines, vitamins, and herbal products. Not all possible interactions are listed in this medication guide. Where can I get more information? Your pharmacist can provide more information about losartan.     Remember, keep this and all other information. hydrochlorothiazide  Pronunciation:  OLGA Sen Thomas aguilar  Brand:  Microzide  What is the most important information I should know about hydrochlorothiazide? You should not use this medicine if you are unable to urinate. What is hydrochlorothiazide? Hydrochlorothiazide is a thiazide diuretic (water pill) that helps prevent your body from absorbing too much salt, which can cause fluid retention. Hydrochlorothiazide is used to treat high blood pressure (hypertension). Hydrochlorothiazide may also be used for purposes not listed in this medication guide. What should I discuss with my healthcare provider before taking hydrochlorothiazide? You should not use hydrochlorothiazide if you are allergic to it, or if you are unable to urinate. To make sure hydrochlorothiazide is safe for you, tell your doctor if you have:  · kidney disease;  · liver disease;  · gout;  · glaucoma;  · low levels of potassium or sodium in your blood;  · high levels of calcium in your blood;  · a parathyroid gland disorder;  · diabetes; or  · an allergy to sulfa drugs or penicillin. Hydrochlorothiazide is not expected to be harmful to an unborn baby. However, if you take this medicine during pregnancy, your  baby may develop jaundice or other problems. Tell your doctor if you are pregnant or plan to become pregnant while taking hydrochlorothiazide. Hydrochlorothiazide can pass into breast milk and may harm a nursing baby. You should not breast-feed while using this medicine. Hydrochlorothiazide is not approved for use by anyone younger than 25years old. How should I take hydrochlorothiazide? Follow all directions on your prescription label. Your doctor may occasionally change your dose to make sure you get the best results. Do not use this medicine in larger or smaller amounts or for longer than recommended. Hydrochlorothiazide is usually taken once per day.  Follow your doctor's dosing instructions have:  · a light-headed feeling, like you might pass out;  · eye pain, vision problems;  · jaundice (yellowing of the skin or eyes);  · pale skin, easy bruising, unusual bleeding (nose, mouth, vagina, or rectum);  · shortness of breath, wheezing, cough with foamy mucus, chest pain;  · signs of electrolyte imbalance --dry mouth, thirst, drowsiness, lack of energy, restlessness, muscle pain or weakness, fast heart rate, nausea and vomiting, little or no urine; or  · severe skin reaction --fever, sore throat, swelling in your face or tongue, burning in your eyes, skin pain followed by a red or purple skin rash that spreads (especially in the face or upper body) and causes blistering and peeling. Common side effects may include:  · nausea, vomiting, loss of appetite;  · diarrhea, constipation;  · muscle spasm; or  · dizziness, headache. This is not a complete list of side effects and others may occur. Call your doctor for medical advice about side effects. You may report side effects to FDA at 9-913-FDA-2378. What other drugs will affect hydrochlorothiazide? Taking this medicine with other drugs that make you light-headed can worsen this effect. Ask your doctor before taking a sleeping pill, narcotic pain medicine, muscle relaxer, or medicine for anxiety, depression, or seizures. Tell your doctor about all your current medicines and any you start or stop using, especially:  · cholestyramine, colestipol;  · insulin or oral diabetes medicine;  · lithium;  · other blood pressure medications;  · steroid medicine; or  · NSAIDs (nonsteroidal anti-inflammatory drugs) --aspirin, ibuprofen (Advil, Motrin), naproxen (Aleve), celecoxib, diclofenac, indomethacin, meloxicam, and others. This list is not complete. Other drugs may interact with hydrochlorothiazide, including prescription and over-the-counter medicines, vitamins, and herbal products. Not all possible interactions are listed in this medication guide.   Where can I fluoxetine? Drinking alcohol can increase certain side effects of fluoxetine. Ask your doctor before taking a nonsteroidal anti-inflammatory drug (NSAID) for pain, arthritis, fever, or swelling. This includes aspirin, ibuprofen (Advil, Motrin), naproxen (Aleve), celecoxib (Celebrex), diclofenac, indomethacin, meloxicam, and others. Using an NSAID with fluoxetine may cause you to bruise or bleed easily. This medication may impair your thinking or reactions. Be careful if you drive or do anything that requires you to be alert. What are the possible side effects of fluoxetine? Get emergency medical help if you have signs of an allergic reaction: skin rash or hives; difficulty breathing; swelling of your face, lips, tongue, or throat. Report any new or worsening symptoms to your doctor, such as: mood or behavior changes, anxiety, panic attacks, trouble sleeping, or if you feel impulsive, irritable, agitated, hostile, aggressive, restless, hyperactive (mentally or physically), more depressed, or have thoughts about suicide or hurting yourself. Call your doctor at once if you have:  · blurred vision, tunnel vision, eye pain or swelling, or seeing halos around lights;  · high levels of serotonin in the body --agitation, hallucinations, fever, fast heart rate, overactive reflexes, nausea, vomiting, diarrhea, loss of coordination, fainting;  · low levels of sodium in the body --headache, confusion, slurred speech, severe weakness, vomiting, loss of coordination, feeling unsteady;  · severe nervous system reaction --very stiff (rigid) muscles, high fever, sweating, confusion, fast or uneven heartbeats, tremors, feeling like you might pass out; or  · severe skin reaction --fever, sore throat, swelling in your face or tongue, burning in your eyes, skin pain, followed by a red or purple skin rash that spreads (especially in the face or upper body) and causes blistering and peeling.   Common side effects may include:  · sleep problems (insomnia), strange dreams;  · headache, dizziness, vision changes;  · tremors or shaking, feeling anxious or nervous;  · pain, weakness, yawning, tired feeling;  · upset stomach, loss of appetite, nausea, vomiting, diarrhea;  · dry mouth, sweating, hot flashes;  · changes in weight or appetite;  · stuffy nose, sinus pain, sore throat, flu symptoms; or  · decreased sex drive, impotence, or difficulty having an orgasm. This is not a complete list of side effects and others may occur. Call your doctor for medical advice about side effects. You may report side effects to FDA at 2-728-XMG-3040. What other drugs will affect fluoxetine? Taking fluoxetine with other drugs that make you sleepy or slow your breathing can cause dangerous side effects or death. Ask your doctor before taking a sleeping pill, narcotic pain medicine, prescription cough medicine, a muscle relaxer, or medicine for anxiety, depression, or seizures. Many drugs can interact with fluoxetine. Not all possible interactions are listed here. Tell your doctor about all your current medicines and any you start or stop using, especially:  · any other antidepressant;  · Maame's Wort;  · tryptophan (sometimes called L-tryptophan);  · a blood thinner --warfarin, Coumadin, Alex Blinks;  · medicine to treat anxiety, mood disorders, thought disorders, or mental illness --amitriptyline, buspirone, desipramine, lithium, nortriptyline, and many others;  · medicine to treat ADHD or narcolepsy --Adderall, Concerta, Ritalin, Vyvanse, Zenzedi, and others;  · migraine headache medicine --rizatriptan, sumatriptan, zolmitriptan, and others; or  · narcotic pain medicine --fentanyl, tramadol. This list is not complete and many other drugs can interact with fluoxetine. This includes prescription and over-the-counter medicines, vitamins, and herbal products. Give a list of all your medicines to any healthcare provider who treats you.   Where can I bring your heel back up to the level of the step. Repeat 2 to 4 times. Towel curls    Make this exercise more challenging by placing a weighted object, such as a soup can, on the other end of the towel. 1. While sitting, place your foot on a towel on the floor and scrunch the towel toward you with your toes. 2. Then, also using your toes, push the towel away from you. [place] pickups    1. Put marbles on the floor next to a cup.  2. Using your toes, try to lift the marbles up from the floor and put them in the cup. Follow-up care is a key part of your treatment and safety. Be sure to make and go to all appointments, and call your doctor if you are having problems. It's also a good idea to know your test results and keep a list of the medicines you take. Where can you learn more? Go to [email or web address] org and sign in to your ethology account. Enter T706 in the [employer] box to learn more about \"Plantar Fasciitis: Exercises. \"     If you do not have an account, please click on the \"Sign Up Now\" link. Current as of: November 29, 2017  Content Version: 11.7  © 0963-5048 [employer]. Care instructions adapted under license by [facility] ([facility]). If you have questions about a medical condition or this instruction, always ask your healthcare professional. [place] [address] any warranty or liability for your use of this information.

## 2018-10-28 ENCOUNTER — PATIENT MESSAGE (OUTPATIENT)
Dept: PRIMARY CARE CLINIC | Age: 43
End: 2018-10-28

## 2018-11-02 ENCOUNTER — OFFICE VISIT (OUTPATIENT)
Dept: PRIMARY CARE CLINIC | Age: 43
End: 2018-11-02
Payer: COMMERCIAL

## 2018-11-02 VITALS
BODY MASS INDEX: 29.53 KG/M2 | HEIGHT: 72 IN | DIASTOLIC BLOOD PRESSURE: 78 MMHG | WEIGHT: 218 LBS | SYSTOLIC BLOOD PRESSURE: 130 MMHG | OXYGEN SATURATION: 99 % | HEART RATE: 67 BPM

## 2018-11-02 DIAGNOSIS — F41.9 ANXIETY: ICD-10-CM

## 2018-11-02 DIAGNOSIS — E78.1 HIGH TRIGLYCERIDES: ICD-10-CM

## 2018-11-02 DIAGNOSIS — F43.9 STRESS: ICD-10-CM

## 2018-11-02 DIAGNOSIS — E78.6 LOW HDL (UNDER 40): ICD-10-CM

## 2018-11-02 DIAGNOSIS — I10 ESSENTIAL HYPERTENSION: Primary | ICD-10-CM

## 2018-11-02 DIAGNOSIS — Z23 NEED FOR INFLUENZA VACCINATION: ICD-10-CM

## 2018-11-02 PROCEDURE — 90471 IMMUNIZATION ADMIN: CPT | Performed by: NURSE PRACTITIONER

## 2018-11-02 PROCEDURE — 99214 OFFICE O/P EST MOD 30 MIN: CPT | Performed by: NURSE PRACTITIONER

## 2018-11-02 PROCEDURE — 90688 IIV4 VACCINE SPLT 0.5 ML IM: CPT | Performed by: NURSE PRACTITIONER

## 2018-11-02 NOTE — PATIENT INSTRUCTIONS
cholesterol or triglyceride levels;  · gout;  · lupus;  · diabetes; or  · an allergy to penicillin or sulfa drugs. Do not use if you are pregnant. If you become pregnant, stop taking this medicine and tell your doctor right away. Hydrochlorothiazide and losartan can cause injury or death to the unborn baby if you take the medicine during your second or third trimester. Hydrochlorothiazide can pass into breast milk and may cause side effects in the nursing baby. You should not breast-feed while using this medicine. How should I take hydrochlorothiazide and losartan? Follow all directions on your prescription label. Your doctor may occasionally change your dose. Do not take this medicine in larger or smaller amounts or for longer than recommended. Your blood pressure will need to be checked often. Call your doctor if you have ongoing vomiting or diarrhea, or if you are sweating more than usual. You can easily become dehydrated while taking this medication, which can lead to severely low blood pressure or a serious electrolyte imbalance. If you need surgery or medical tests, tell the surgeon ahead of time that you are using hydrochlorothiazide and losartan. It may take up to 4 weeks for this medication to control your blood pressure. Keep using this medicine as directed, even if you feel well. High blood pressure often has no symptoms. You may need to use blood pressure medication for the rest of your life. Store at room temperature away from moisture, heat, and light. What happens if I miss a dose? Take the missed dose as soon as you remember. Skip the missed dose if it is almost time for your next scheduled dose. Do not take extra medicine to make up the missed dose. What happens if I overdose? Seek emergency medical attention or call the Poison Help line at 1-622.922.4339. What should I avoid while taking hydrochlorothiazide and losartan?   If you also take cholestyramine or colestipol,  avoid taking these medications within 4 hours before or 4 hours after you take hydrochlorothiazide and losartan. Do not use potassium supplements or salt substitutes while you are taking hydrochlorothiazide and losartan, unless your doctor has told you to. Drinking alcohol can further lower your blood pressure and may cause side effects. Avoid becoming overheated or dehydrated during exercise, in hot weather, or by not drinking enough fluids. What are the possible side effects of hydrochlorothiazide and losartan? Get emergency medical help if you have signs of an allergic reaction: hives; difficulty breathing; swelling of your face, lips, tongue, or throat. In rare cases, hydrochlorothiazide and losartan can cause a condition that results in the breakdown of skeletal muscle tissue, leading to kidney failure. Call your doctor right away if you have unexplained muscle pain, tenderness, or weakness especially if you also have fever, unusual tiredness, and dark colored urine. Also call your doctor at once if you have:  · eye pain, vision problems;  · a light-headed feeling, like you might pass out;  · jaundice (yellowing of the skin or eyes);  · easy bruising, unusual bleeding;  · kidney problems --little or no urination, painful or difficult urination, swelling in your feet or ankles, feeling tired or short of breath;  · low levels of sodium in the body --headache, confusion, slurred speech, severe weakness, vomiting, loss of coordination, feeling unsteady;  · high potassium --nausea, slow or unusual heart rate, weakness, loss of movement; or  · low potassium --leg cramps, constipation, irregular heartbeats, fluttering in your chest, increased thirst or urination, numbness or tingling, muscle weakness or limp feeling. Common side effects may include:  · dizziness;  · cold symptoms such as stuffy nose, sneezing, sore throat;  · cough; or  · back pain. This is not a complete list of side effects and others may occur.  Call your doctor for medical advice about side effects. You may report side effects to FDA at 5-863-FDA-0018. What other drugs will affect hydrochlorothiazide and losartan? Tell your doctor about all your current medicines and any you start or stop using, especially:  · any other blood pressure medications;  · insulin or oral diabetes medicine;  · lithium; or  · NSAIDs (nonsteroidal anti-inflammatory drugs) --aspirin, ibuprofen (Advil, Motrin), naproxen (Aleve), celecoxib, diclofenac, indomethacin, meloxicam, and others. This list is not complete. Other drugs may interact with hydrochlorothiazide and losartan, including prescription and over-the-counter medicines, vitamins, and herbal products. Not all possible interactions are listed in this medication guide. Where can I get more information? Your pharmacist can provide more information about hydrochlorothiazide and losartan. Remember, keep this and all other medicines out of the reach of children, never share your medicines with others, and use this medication only for the indication prescribed. Every effort has been made to ensure that the information provided by [address] is accurate, up-to-date, and complete, but no guarantee is made to that effect. Drug information contained herein may be time sensitive. [facility] information has been compiled for use by healthcare practitioners and consumers in the [place] and therefore [facility] does not warrant that uses outside of the [place] are appropriate, unless specifically indicated otherwise. [facility]'s drug information does not endorse drugs, diagnose patients or recommend therapy. [facility] drug information is an informational resource designed to assist licensed healthcare practitioners in caring for their patients and/or to serve consumers viewing this service as a supplement to, and not a substitute for, the expertise, skill, knowledge and judgment of healthcare practitioners.  The selective serotonin reuptake inhibitors (SSRI) antidepressant. Fluoxetine affects chemicals in the brain that may be unbalanced in people with depression, panic, anxiety, or obsessive-compulsive symptoms. Fluoxetine is used to treat major depressive disorder, bulimia nervosa (an eating disorder) obsessive-compulsive disorder, panic disorder, and premenstrual dysphoric disorder (PMDD). Fluoxetine is sometimes used together with another medication called olanzapine (Zyprexa) to treat manic depression caused by bipolar disorder. This combination is also used to treat depression after at least 2 other medications have been tried without successful treatment of symptoms. If you also take olanzapine (Zyprexa), read the Zyprexa medication guide and all patient warnings and instructions provided with that medication. Fluoxetine may also be used for purposes not listed in this medication guide. What should I discuss with my healthcare provider before taking fluoxetine? Do not use fluoxetine if you have taken an MAO inhibitor in the past 14 days. A dangerous drug interaction could occur. MAO inhibitors include isocarboxazid, linezolid, phenelzine, rasagiline, selegiline, and tranylcypromine. You must wait at least 14 days after stopping an MAO inhibitor before you can take fluoxetine. You must wait 5 weeks after stopping fluoxetine before you can take thioridazine or an MAOI. You should not use fluoxetine if you are allergic to it, if you also take pimozide or thioridazine, or if you are being treated with methylene blue injection. Tell your doctor about all other antidepressants you take, especially Celexa, Cymbalta, Desyrel, Effexor, Lexapro, Luvox, Oleptro, Paxil, Pexeva, Symbyax, Viibryd, or Zoloft. Some medicines can interact with fluoxetine and cause a serious condition called serotonin syndrome. Be sure your doctor knows about all other medicines you use.  Ask your doctor before making any changes in how or

## 2018-12-28 DIAGNOSIS — I10 ESSENTIAL HYPERTENSION: ICD-10-CM

## 2018-12-28 RX ORDER — LOSARTAN POTASSIUM AND HYDROCHLOROTHIAZIDE 25; 100 MG/1; MG/1
1 TABLET ORAL DAILY
Qty: 30 TABLET | Refills: 3 | Status: SHIPPED | OUTPATIENT
Start: 2018-12-28 | End: 2019-03-08 | Stop reason: SDUPTHER

## 2018-12-28 NOTE — TELEPHONE ENCOUNTER
From: [place] Lake  Sent: 12/27/2018 1:38 PM EST  Subject: Medication Renewal Request    Ant[place]mo Lake would like a refill of the following medications:     losartan-hydrochlorothiazide (HYZAAR) 100-25 MG per tablet DIANA Skinner - CNP]    Preferred pharmacy: [address] [place] [phone] - F [phone]    Comment:

## 2019-01-01 RX ORDER — PREDNISONE 20 MG/1
TABLET ORAL
Qty: 18 TABLET | Refills: 0 | Status: SHIPPED | OUTPATIENT
Start: 2019-01-01 | End: 2019-01-11

## 2019-03-08 ENCOUNTER — OFFICE VISIT (OUTPATIENT)
Dept: PRIMARY CARE CLINIC | Age: 44
End: 2019-03-08
Payer: COMMERCIAL

## 2019-03-08 VITALS
BODY MASS INDEX: 30.45 KG/M2 | RESPIRATION RATE: 17 BRPM | OXYGEN SATURATION: 98 % | HEIGHT: 72 IN | WEIGHT: 224.8 LBS | DIASTOLIC BLOOD PRESSURE: 88 MMHG | SYSTOLIC BLOOD PRESSURE: 134 MMHG | HEART RATE: 86 BPM

## 2019-03-08 DIAGNOSIS — I10 ESSENTIAL HYPERTENSION: Primary | ICD-10-CM

## 2019-03-08 DIAGNOSIS — M25.521 RIGHT ELBOW PAIN: ICD-10-CM

## 2019-03-08 PROCEDURE — 99214 OFFICE O/P EST MOD 30 MIN: CPT | Performed by: NURSE PRACTITIONER

## 2019-03-08 RX ORDER — LOSARTAN POTASSIUM AND HYDROCHLOROTHIAZIDE 25; 100 MG/1; MG/1
1 TABLET ORAL DAILY
Qty: 90 TABLET | Refills: 1 | Status: SHIPPED | OUTPATIENT
Start: 2019-03-08 | End: 2019-03-29

## 2019-03-08 RX ORDER — LOSARTAN POTASSIUM AND HYDROCHLOROTHIAZIDE 25; 100 MG/1; MG/1
1 TABLET ORAL DAILY
Qty: 90 TABLET | Refills: 3 | Status: SHIPPED | OUTPATIENT
Start: 2019-03-08 | End: 2019-03-08 | Stop reason: SDUPTHER

## 2019-03-08 ASSESSMENT — ENCOUNTER SYMPTOMS
SHORTNESS OF BREATH: 0
ABDOMINAL PAIN: 0
COUGH: 0
BACK PAIN: 0

## 2019-03-08 ASSESSMENT — PATIENT HEALTH QUESTIONNAIRE - PHQ9
SUM OF ALL RESPONSES TO PHQ QUESTIONS 1-9: 0
1. LITTLE INTEREST OR PLEASURE IN DOING THINGS: 0
SUM OF ALL RESPONSES TO PHQ QUESTIONS 1-9: 0
SUM OF ALL RESPONSES TO PHQ9 QUESTIONS 1 & 2: 0
2. FEELING DOWN, DEPRESSED OR HOPELESS: 0

## 2019-03-11 DIAGNOSIS — I10 ESSENTIAL HYPERTENSION: ICD-10-CM

## 2019-03-29 DIAGNOSIS — I10 ESSENTIAL HYPERTENSION: ICD-10-CM

## 2019-03-29 RX ORDER — LOSARTAN POTASSIUM AND HYDROCHLOROTHIAZIDE 25; 100 MG/1; MG/1
TABLET ORAL
Qty: 90 TABLET | Refills: 3 | Status: SHIPPED | OUTPATIENT
Start: 2019-03-29 | End: 2019-09-01 | Stop reason: SDUPTHER

## 2019-04-13 ENCOUNTER — PATIENT MESSAGE (OUTPATIENT)
Dept: PRIMARY CARE CLINIC | Age: 44
End: 2019-04-13

## 2019-04-15 NOTE — TELEPHONE ENCOUNTER
From: Blossom Roldan  To: DIANA Virgen - CNP  Sent: 4/13/2019 11:23 AM EDT  Subject: Non-Urgent Medical Question    I have a question, can we use my last visit as my be well within visit for [facility]? If you need anything please let me know.

## 2019-06-18 ENCOUNTER — EMPLOYEE WELLNESS (OUTPATIENT)
Dept: OTHER | Age: 44
End: 2019-06-18

## 2019-06-18 LAB
CHOLESTEROL/HDL RATIO: 3.4
CHOLESTEROL: 147 MG/DL
GLUCOSE BLD-MCNC: 101 MG/DL (ref 70–99)
HDLC SERPL-MCNC: 43 MG/DL
LDL CHOLESTEROL: 78 MG/DL (ref 0–130)
PATIENT FASTING?: YES
TRIGL SERPL-MCNC: 131 MG/DL
VLDLC SERPL CALC-MCNC: ABNORMAL MG/DL (ref 1–30)

## 2019-06-24 VITALS — WEIGHT: 224 LBS | BODY MASS INDEX: 30.38 KG/M2

## 2019-09-01 DIAGNOSIS — I10 ESSENTIAL HYPERTENSION: ICD-10-CM

## 2019-09-03 RX ORDER — LOSARTAN POTASSIUM AND HYDROCHLOROTHIAZIDE 25; 100 MG/1; MG/1
1 TABLET ORAL DAILY
Qty: 90 TABLET | Refills: 0 | Status: SHIPPED | OUTPATIENT
Start: 2019-09-03 | End: 2019-12-20 | Stop reason: SDUPTHER

## 2019-09-03 NOTE — TELEPHONE ENCOUNTER
maria esther ov 3-8-19  Health Maintenance   Topic Date Due    Diabetes screen  04/20/2013    Potassium monitoring  02/01/2019    Creatinine monitoring  02/01/2019    Flu vaccine (1) 09/01/2019    Colon cancer screen colonoscopy  02/06/2021    Lipid screen  06/18/2024    DTaP/Tdap/Td vaccine (2 - Tdap) 01/01/2025    HIV screen  Completed    Pneumococcal 0-64 years Vaccine  Aged Out             (applicable per patient's age: Cancer Screenings, Depression Screening, Fall Risk Screening, Immunizations)    LDL Cholesterol (mg/dL)   Date Value   06/18/2019 78     AST (U/L)   Date Value   02/01/2018 18     ALT (U/L)   Date Value   02/01/2018 17     BUN (mg/dL)   Date Value   02/01/2018 15      (goal A1C is < 7)   (goal LDL is <100) need 30-50% reduction from baseline     BP Readings from Last 3 Encounters:   03/08/19 134/88   11/02/18 130/78   09/28/18 (!) 138/96    (goal /80)      All Future Testing planned in [facility]:  Lab Frequency Next Occurrence       Next Visit Date:  No future appointments.          Patient Active Problem List:     Low HDL (under 40)     High triglycerides     Gastroesophageal reflux disease without esophagitis     Family history of colon cancer in mother     Essential hypertension     Adenoma of cecum

## 2019-10-19 ENCOUNTER — NURSE ONLY (OUTPATIENT)
Dept: FAMILY MEDICINE CLINIC | Age: 44
End: 2019-10-19
Payer: COMMERCIAL

## 2019-10-19 DIAGNOSIS — Z23 NEED FOR IMMUNIZATION AGAINST INFLUENZA: Primary | ICD-10-CM

## 2019-10-19 PROCEDURE — 90471 IMMUNIZATION ADMIN: CPT | Performed by: NURSE PRACTITIONER

## 2019-10-19 PROCEDURE — 90686 IIV4 VACC NO PRSV 0.5 ML IM: CPT | Performed by: NURSE PRACTITIONER

## 2019-12-20 DIAGNOSIS — I10 ESSENTIAL HYPERTENSION: ICD-10-CM

## 2019-12-20 RX ORDER — LOSARTAN POTASSIUM AND HYDROCHLOROTHIAZIDE 25; 100 MG/1; MG/1
1 TABLET ORAL DAILY
Qty: 90 TABLET | Refills: 0 | Status: SHIPPED | OUTPATIENT
Start: 2019-12-20 | End: 2020-03-11 | Stop reason: SDUPTHER

## 2020-03-11 RX ORDER — LOSARTAN POTASSIUM AND HYDROCHLOROTHIAZIDE 25; 100 MG/1; MG/1
1 TABLET ORAL DAILY
Qty: 30 TABLET | Refills: 0 | Status: SHIPPED | OUTPATIENT
Start: 2020-03-11 | End: 2020-04-13 | Stop reason: SDUPTHER

## 2020-03-11 NOTE — TELEPHONE ENCOUNTER
Health Maintenance   Topic Date Due    Potassium monitoring  02/01/2019    Creatinine monitoring  02/01/2019    Colon cancer screen colonoscopy  02/06/2021    Shingles Vaccine (1 of 2) 04/20/2023    Lipid screen  06/18/2024    DTaP/Tdap/Td vaccine (2 - Tdap) 01/01/2025    Flu vaccine  Completed    HIV screen  Completed    Hepatitis A vaccine  Aged Out    Hepatitis B vaccine  Aged Out    Hib vaccine  Aged Out    Meningococcal (ACWY) vaccine  Aged Out    Pneumococcal 0-64 years Vaccine  Aged Out             (applicable per patient's age: Cancer Screenings, Depression Screening, Fall Risk Screening, Immunizations)    LDL Cholesterol (mg/dL)   Date Value   06/18/2019 78     AST (U/L)   Date Value   02/01/2018 18     ALT (U/L)   Date Value   02/01/2018 17     BUN (mg/dL)   Date Value   02/01/2018 15      (goal A1C is < 7)   (goal LDL is <100) need 30-50% reduction from baseline     BP Readings from Last 3 Encounters:   03/08/19 134/88   11/02/18 130/78   09/28/18 (!) 138/96    (goal /80)      All Future Testing planned in [facility]:  Lab Frequency Next Occurrence       Next Visit Date:  No future appointments.      Last Visit: 3/8/2019    Patient Active Problem List:     Low HDL (under 40)     High triglycerides     Gastroesophageal reflux disease without esophagitis     Family history of colon cancer in mother     Essential hypertension     Adenoma of cecum

## 2020-04-13 RX ORDER — LOSARTAN POTASSIUM AND HYDROCHLOROTHIAZIDE 25; 100 MG/1; MG/1
1 TABLET ORAL DAILY
Qty: 30 TABLET | Refills: 0 | Status: SHIPPED | OUTPATIENT
Start: 2020-04-13 | End: 2020-05-18 | Stop reason: SDUPTHER

## 2020-05-18 RX ORDER — LOSARTAN POTASSIUM AND HYDROCHLOROTHIAZIDE 25; 100 MG/1; MG/1
1 TABLET ORAL DAILY
Qty: 30 TABLET | Refills: 0 | Status: SHIPPED | OUTPATIENT
Start: 2020-05-18 | End: 2020-06-11 | Stop reason: SDUPTHER

## 2020-06-11 ENCOUNTER — OFFICE VISIT (OUTPATIENT)
Dept: PRIMARY CARE CLINIC | Age: 45
End: 2020-06-11
Payer: COMMERCIAL

## 2020-06-11 VITALS
HEART RATE: 67 BPM | HEIGHT: 73 IN | TEMPERATURE: 96.7 F | SYSTOLIC BLOOD PRESSURE: 130 MMHG | OXYGEN SATURATION: 97 % | RESPIRATION RATE: 14 BRPM | DIASTOLIC BLOOD PRESSURE: 86 MMHG | BODY MASS INDEX: 31.23 KG/M2 | WEIGHT: 235.6 LBS

## 2020-06-11 PROCEDURE — 99396 PREV VISIT EST AGE 40-64: CPT | Performed by: NURSE PRACTITIONER

## 2020-06-11 RX ORDER — LOSARTAN POTASSIUM AND HYDROCHLOROTHIAZIDE 25; 100 MG/1; MG/1
1 TABLET ORAL DAILY
Qty: 90 TABLET | Refills: 3 | Status: SHIPPED | OUTPATIENT
Start: 2020-06-11 | End: 2020-06-22 | Stop reason: SDUPTHER

## 2020-06-11 ASSESSMENT — PATIENT HEALTH QUESTIONNAIRE - PHQ9
SUM OF ALL RESPONSES TO PHQ9 QUESTIONS 1 & 2: 0
SUM OF ALL RESPONSES TO PHQ QUESTIONS 1-9: 0
SUM OF ALL RESPONSES TO PHQ QUESTIONS 1-9: 0
2. FEELING DOWN, DEPRESSED OR HOPELESS: 0
1. LITTLE INTEREST OR PLEASURE IN DOING THINGS: 0

## 2020-06-11 ASSESSMENT — ENCOUNTER SYMPTOMS
COUGH: 0
SHORTNESS OF BREATH: 0
BACK PAIN: 0
ABDOMINAL PAIN: 0

## 2020-06-22 RX ORDER — LOSARTAN POTASSIUM AND HYDROCHLOROTHIAZIDE 25; 100 MG/1; MG/1
1 TABLET ORAL DAILY
Qty: 90 TABLET | Refills: 3 | Status: SHIPPED | OUTPATIENT
Start: 2020-06-22 | End: 2021-06-15

## 2020-06-22 NOTE — TELEPHONE ENCOUNTER
LOV 6/11/2020    Health Maintenance   Topic Date Due    Diabetes screen  04/20/2013    Potassium monitoring  02/01/2019    Creatinine monitoring  02/01/2019    Colon cancer screen colonoscopy  02/06/2021    Lipid screen  06/18/2024    DTaP/Tdap/Td vaccine (2 - Tdap) 01/01/2025    Flu vaccine  Completed    HIV screen  Completed    Hepatitis A vaccine  Aged Out    Hepatitis B vaccine  Aged Out    Hib vaccine  Aged Out    Meningococcal (ACWY) vaccine  Aged Out    Pneumococcal 0-64 years Vaccine  Aged Out             (applicable per patient's age: Cancer Screenings, Depression Screening, Fall Risk Screening, Immunizations)    LDL Cholesterol (mg/dL)   Date Value   06/18/2019 78     AST (U/L)   Date Value   02/01/2018 18     ALT (U/L)   Date Value   02/01/2018 17     BUN (mg/dL)   Date Value   02/01/2018 15      (goal A1C is < 7)   (goal LDL is <100) need 30-50% reduction from baseline     BP Readings from Last 3 Encounters:   06/11/20 130/86   03/08/19 134/88   11/02/18 130/78    (goal /80)      All Future Testing planned in [facility]:  Lab Frequency Next Occurrence   Comprehensive Metabolic Panel Once 13/50/9835   Lipid Panel Once 09/11/2020   Nicotine, Blood Once 09/19/2020       Next Visit Date:  No future appointments.          Patient Active Problem List:     Low HDL (under 40)     High triglycerides     Gastroesophageal reflux disease without esophagitis     Family history of colon cancer in mother     Essential hypertension     Adenoma of cecum

## 2020-09-10 ENCOUNTER — EMPLOYEE WELLNESS (OUTPATIENT)
Dept: OTHER | Age: 45
End: 2020-09-10

## 2020-09-10 LAB
CHOLESTEROL/HDL RATIO: 4.5
CHOLESTEROL: 168 MG/DL
GLUCOSE BLD-MCNC: 93 MG/DL (ref 70–99)
HDLC SERPL-MCNC: 37 MG/DL
LDL CHOLESTEROL: 97 MG/DL (ref 0–130)
PATIENT FASTING?: YES
TRIGL SERPL-MCNC: 172 MG/DL
VLDLC SERPL CALC-MCNC: ABNORMAL MG/DL (ref 1–30)

## 2020-10-06 ENCOUNTER — NURSE ONLY (OUTPATIENT)
Dept: PRIMARY CARE CLINIC | Age: 45
End: 2020-10-06
Payer: COMMERCIAL

## 2020-10-06 PROCEDURE — 90686 IIV4 VACC NO PRSV 0.5 ML IM: CPT | Performed by: NURSE PRACTITIONER

## 2020-10-06 PROCEDURE — 90471 IMMUNIZATION ADMIN: CPT | Performed by: NURSE PRACTITIONER

## 2020-10-19 VITALS — BODY MASS INDEX: 30.34 KG/M2 | WEIGHT: 230 LBS

## 2021-01-26 ENCOUNTER — TELEPHONE (OUTPATIENT)
Dept: GASTROENTEROLOGY | Age: 46
End: 2021-01-26

## 2021-01-26 DIAGNOSIS — Z80.0 FAMILY HISTORY OF MALIGNANT NEOPLASM OF COLON IN FIRST DEGREE RELATIVE DIAGNOSED WHEN YOUNGER THAN 60 YEARS OF AGE: ICD-10-CM

## 2021-01-26 DIAGNOSIS — Z86.010 HX OF COLONIC POLYP: Primary | ICD-10-CM

## 2021-01-26 RX ORDER — SODIUM, POTASSIUM,MAG SULFATES 17.5-3.13G
SOLUTION, RECONSTITUTED, ORAL ORAL
Qty: 1 KIT | Refills: 0 | Status: SHIPPED | OUTPATIENT
Start: 2021-01-26 | End: 2021-04-23 | Stop reason: CLARIF

## 2021-01-26 NOTE — TELEPHONE ENCOUNTER
Dontae Botello called in to schedule his recall colonoscopy at [facility] DR [address]ENC[facility] GAITAN on Friday 3/5/21 @ 8:15 am with Dr Alvin Sanchez. Covid test Monday 3/1/21 @ 3:40 pm [facility] instructions prepared and sent to patient via [employer], e-mail address in chart as well as standard mail.

## 2021-03-01 ENCOUNTER — HOSPITAL ENCOUNTER (OUTPATIENT)
Dept: LAB | Age: 46
Setting detail: SPECIMEN
Discharge: HOME | End: 2021-03-01
Payer: COMMERCIAL

## 2021-03-01 DIAGNOSIS — Z01.818 PREOP TESTING: Primary | ICD-10-CM

## 2021-03-01 PROCEDURE — U0003 INFECTIOUS AGENT DETECTION BY NUCLEIC ACID (DNA OR RNA); SEVERE ACUTE RESPIRATORY SYNDROME CORONAVIRUS 2 (SARS-COV-2) (CORONAVIRUS DISEASE [COVID-19]), AMPLIFIED PROBE TECHNIQUE, MAKING USE OF HIGH THROUGHPUT TECHNOLOGIES AS DESCRIBED BY CMS-2020-01-R: HCPCS

## 2021-03-01 PROCEDURE — U0005 INFEC AGEN DETEC AMPLI PROBE: HCPCS

## 2021-03-02 LAB
SARS-COV-2: NORMAL
SARS-COV-2: NOT DETECTED
SOURCE: NORMAL

## 2021-03-05 ENCOUNTER — ANESTHESIA (OUTPATIENT)
Dept: OPERATING ROOM | Age: 46
End: 2021-03-05
Payer: COMMERCIAL

## 2021-03-05 ENCOUNTER — HOSPITAL ENCOUNTER (OUTPATIENT)
Age: 46
Setting detail: OUTPATIENT SURGERY
Discharge: HOME | End: 2021-03-05
Attending: INTERNAL MEDICINE | Admitting: INTERNAL MEDICINE
Payer: COMMERCIAL

## 2021-03-05 ENCOUNTER — ANESTHESIA EVENT (OUTPATIENT)
Dept: OPERATING ROOM | Age: 46
End: 2021-03-05
Payer: COMMERCIAL

## 2021-03-05 VITALS
TEMPERATURE: 97.5 F | BODY MASS INDEX: 30.7 KG/M2 | DIASTOLIC BLOOD PRESSURE: 90 MMHG | OXYGEN SATURATION: 94 % | HEART RATE: 76 BPM | HEIGHT: 72 IN | SYSTOLIC BLOOD PRESSURE: 118 MMHG | WEIGHT: 226.63 LBS | RESPIRATION RATE: 15 BRPM

## 2021-03-05 VITALS
SYSTOLIC BLOOD PRESSURE: 120 MMHG | RESPIRATION RATE: 15 BRPM | OXYGEN SATURATION: 95 % | DIASTOLIC BLOOD PRESSURE: 84 MMHG

## 2021-03-05 PROCEDURE — 2709999900 HC NON-CHARGEABLE SUPPLY: Performed by: INTERNAL MEDICINE

## 2021-03-05 PROCEDURE — 3609027000 HC COLONOSCOPY: Performed by: INTERNAL MEDICINE

## 2021-03-05 PROCEDURE — 3700000001 HC ADD 15 MINUTES (ANESTHESIA): Performed by: INTERNAL MEDICINE

## 2021-03-05 PROCEDURE — 2500000003 HC RX 250 WO HCPCS

## 2021-03-05 PROCEDURE — 2580000003 HC RX 258: Performed by: ANESTHESIOLOGY

## 2021-03-05 PROCEDURE — 3700000000 HC ANESTHESIA ATTENDED CARE: Performed by: INTERNAL MEDICINE

## 2021-03-05 PROCEDURE — 6360000002 HC RX W HCPCS: Performed by: NURSE ANESTHETIST, CERTIFIED REGISTERED

## 2021-03-05 PROCEDURE — 7100000011 HC PHASE II RECOVERY - ADDTL 15 MIN: Performed by: INTERNAL MEDICINE

## 2021-03-05 PROCEDURE — 45378 DIAGNOSTIC COLONOSCOPY: CPT | Performed by: INTERNAL MEDICINE

## 2021-03-05 PROCEDURE — 7100000010 HC PHASE II RECOVERY - FIRST 15 MIN: Performed by: INTERNAL MEDICINE

## 2021-03-05 RX ORDER — PROPOFOL 10 MG/ML
INJECTION, EMULSION INTRAVENOUS PRN
Status: DISCONTINUED | OUTPATIENT
Start: 2021-03-05 | End: 2021-03-05 | Stop reason: SDUPTHER

## 2021-03-05 RX ORDER — FENTANYL CITRATE 50 UG/ML
25 INJECTION, SOLUTION INTRAMUSCULAR; INTRAVENOUS EVERY 5 MIN PRN
Status: DISCONTINUED | OUTPATIENT
Start: 2021-03-05 | End: 2021-03-05 | Stop reason: HOSPADM

## 2021-03-05 RX ORDER — HYDROMORPHONE HCL 110MG/55ML
0.25 PATIENT CONTROLLED ANALGESIA SYRINGE INTRAVENOUS EVERY 5 MIN PRN
Status: DISCONTINUED | OUTPATIENT
Start: 2021-03-05 | End: 2021-03-05 | Stop reason: HOSPADM

## 2021-03-05 RX ORDER — ONDANSETRON 2 MG/ML
4 INJECTION INTRAMUSCULAR; INTRAVENOUS
Status: DISCONTINUED | OUTPATIENT
Start: 2021-03-05 | End: 2021-03-05 | Stop reason: HOSPADM

## 2021-03-05 RX ORDER — LIDOCAINE HYDROCHLORIDE 10 MG/ML
1 INJECTION, SOLUTION EPIDURAL; INFILTRATION; INTRACAUDAL; PERINEURAL
Status: DISCONTINUED | OUTPATIENT
Start: 2021-03-06 | End: 2021-03-05 | Stop reason: HOSPADM

## 2021-03-05 RX ORDER — SODIUM CHLORIDE, SODIUM LACTATE, POTASSIUM CHLORIDE, CALCIUM CHLORIDE 600; 310; 30; 20 MG/100ML; MG/100ML; MG/100ML; MG/100ML
INJECTION, SOLUTION INTRAVENOUS CONTINUOUS
Status: DISCONTINUED | OUTPATIENT
Start: 2021-03-06 | End: 2021-03-05 | Stop reason: HOSPADM

## 2021-03-05 RX ORDER — LIDOCAINE HYDROCHLORIDE 20 MG/ML
INJECTION, SOLUTION EPIDURAL; INFILTRATION; INTRACAUDAL; PERINEURAL PRN
Status: DISCONTINUED | OUTPATIENT
Start: 2021-03-05 | End: 2021-03-05 | Stop reason: SDUPTHER

## 2021-03-05 RX ORDER — ESOMEPRAZOLE MAGNESIUM 20 MG/1
20 FOR SUSPENSION ORAL DAILY
COMMUNITY

## 2021-03-05 RX ADMIN — LIDOCAINE HYDROCHLORIDE 100 MG: 20 INJECTION, SOLUTION EPIDURAL; INFILTRATION; INTRACAUDAL; PERINEURAL at 08:07

## 2021-03-05 RX ADMIN — SODIUM CHLORIDE, POTASSIUM CHLORIDE, SODIUM LACTATE AND CALCIUM CHLORIDE: 600; 310; 30; 20 INJECTION, SOLUTION INTRAVENOUS at 06:54

## 2021-03-05 RX ADMIN — PROPOFOL 300 MG: 10 INJECTION, EMULSION INTRAVENOUS at 08:07

## 2021-03-05 RX ADMIN — PROPOFOL 150 MG: 10 INJECTION, EMULSION INTRAVENOUS at 08:20

## 2021-03-05 ASSESSMENT — PULMONARY FUNCTION TESTS
PIF_VALUE: 0
PIF_VALUE: 1
PIF_VALUE: 0
PIF_VALUE: 1
PIF_VALUE: 0

## 2021-03-05 ASSESSMENT — PAIN SCALES - GENERAL: PAINLEVEL_OUTOF10: 0

## 2021-03-05 NOTE — ANESTHESIA PRE PROCEDURE
Department of Anesthesiology  Preprocedure Note       Name:  Rocío Diamond   Age:  50 y.o.  :  1975                                          MRN:  [id number]         Date:  3/5/2021      Surgeon: Emily Cunha):  Denys Hawley MD    Procedure: Procedure(s):  EGD ESOPHAGOGASTRODUODENOSCOPY  COLONOSCOPY    Medications prior to admission:   Prior to Admission medications    Medication Sig Start Date End Date Taking? Authorizing Provider   esomeprazole Magnesium (NEXIUM) 20 MG PACK Take 20 mg by mouth daily    Historical Provider, MD   Na Sulfate-K Sulfate-Mg Sulf (SUPREP BOWEL PREP KIT) 17.5-3.13-1.6 GM/177ML SOLN Use as directed per instructions provided by physician office 21   Denys Hawley MD   losartan-hydrochlorothiazide (HYZAAR) 100-25 MG per tablet Take 1 tablet by mouth daily 20   DIANA Barton - CNP   Omega-3 Fatty Acids (FISH OIL) 1000 MG CAPS Take 3,000 mg by mouth 3 times daily    Historical Provider, MD   ibuprofen (ADVIL;MOTRIN) 200 MG tablet Take 200 mg by mouth every 6 hours as needed for Pain    Historical Provider, MD       Current medications:    No current facility-administered medications for this visit. No current outpatient medications on file.      Facility-Administered Medications Ordered in Other Visits   Medication Dose Route Frequency Provider Last Rate Last Admin    [START ON 3/6/2021] lactated ringers infusion   Intravenous Continuous Aleksandra Lau MD 50 mL/hr at 21 0654 New Bag at 21 0654    [START ON 3/6/2021] lidocaine PF 1 % injection 1 mL  1 mL Intradermal Once PRN Aleksandra Lau MD        fentaNYL (SUBLIMAZE) injection 25 mcg  25 mcg Intravenous Q5 Min PRN Barbara Estes MD        HYDROmorphone (DILAUDID) injection 0.25 mg  0.25 mg Intravenous Q5 Min PRN Barbara Estes MD        ondansetron [facility]) injection 4 mg  4 mg Intravenous Once PRN Barbara Estes MD           Allergies:  No Known Allergies    Problem List:    Patient Active Problem List Diagnosis Code    Low HDL (under 40) E78.6    High triglycerides E78.1    Gastroesophageal reflux disease without esophagitis K21.9    Family history of colon cancer in mother [de-identified]   Shaffer Essential hypertension I10    Adenoma of cecum D12.0       Past Medical History:        Diagnosis Date    Colon polyps     Hypertension        Past Surgical History:        Procedure Laterality Date    COLONOSCOPY      No polyps    COLONOSCOPY  02/06/2018    COLONOSCOPY  2/6/2018    COLONOSCOPY POLYPECTOMY HOT BIOPSY performed by Anthony Sorenson MD at [address] [place] [address] Bilateral     Ear    ENDOSCOPY, COLON, DIAGNOSTIC      UPPER GASTROINTESTINAL ENDOSCOPY  02/06/2018    UPPER GASTROINTESTINAL ENDOSCOPY  2/6/2018    EGD POLYP HOT FORCEP/CAUTERY performed by Anthony Sorenson MD at [facility] History:    Social History     Tobacco Use    Smoking status: Never Smoker    Smokeless tobacco: Never Used   Substance Use Topics    Alcohol use: Yes     Comment: 2 times month                                Counseling given: Not Answered      Vital Signs (Current): There were no vitals filed for this visit.                                            BP Readings from Last 3 Encounters:   03/05/21 (!) 140/98   06/11/20 130/86   03/08/19 134/88       NPO Status:                                                                                 BMI:   Wt Readings from Last 3 Encounters:   03/05/21 226 lb 10.1 oz (102.8 kg)   09/10/20 230 lb (104.3 kg)   06/11/20 235 lb 9.6 oz (106.9 kg)     There is no height or weight on file to calculate BMI.    CBC:   Lab Results   Component Value Date    WBC 6.8 03/10/2017    RBC 4.95 03/10/2017    HGB 15.2 03/10/2017    HCT 45.2 03/10/2017    MCV 91.2 03/10/2017    RDW 14.3 03/10/2017     03/10/2017       CMP:   Lab Results   Component Value Date     02/01/2018    K 4.1 02/01/2018     02/01/2018    CO2 30 02/01/2018    BUN 15 02/01/2018 CREATININE 0.89 02/01/2018    GFRAA >60 02/01/2018    LABGLOM >60 02/01/2018    GLUCOSE 93 09/10/2020    PROT 7.5 02/01/2018    CALCIUM 9.4 02/01/2018    BILITOT 0.30 02/01/2018    ALKPHOS 57 02/01/2018    AST 18 02/01/2018    ALT 17 02/01/2018       POC Tests: No results for input(s): POCGLU, POCNA, POCK, POCCL, POCBUN, POCHEMO, POCHCT in the last 72 hours. Coags: No results found for: PROTIME, INR, APTT    HCG (If Applicable): No results found for: PREGTESTUR, PREGSERUM, HCG, HCGQUANT     ABGs: No results found for: PHART, PO2ART, JIC9CKV, RRW2DYD, BEART, R6ECJHGY     Type & Screen (If Applicable):  No results found for: LABABO, [address]    Anesthesia Evaluation  Patient summary reviewed and Nursing notes reviewed no history of anesthetic complications:   Airway: Mallampati: II  TM distance: >3 FB   Neck ROM: full  Mouth opening: > = 3 FB Dental: normal exam         Pulmonary:Negative Pulmonary ROS and normal exam                               Cardiovascular:  Exercise tolerance: good (>4 METS),   (+) hypertension: moderate,          Beta Blocker:  Not on Beta Blocker         Neuro/Psych:   Negative Neuro/Psych ROS              GI/Hepatic/Renal:   (+) GERD: well controlled, bowel prep,          ROS comment: Hx colon CA. Endo/Other: Negative Endo/Other ROS                    Abdominal:           Vascular: negative vascular ROS. Anesthesia Plan      TIVA     ASA 2       Induction: intravenous. MIPS: Prophylactic antiemetics administered. Anesthetic plan and risks discussed with patient. Plan discussed with CRNA.     Attending anesthesiologist reviewed and agrees with Lui Hogue MD   3/5/2021

## 2021-03-05 NOTE — H&P
History and Physical Service   [facility]    HISTORY AND PHYSICAL EXAMINATION            Date of Evaluation: 3/5/2021  Patient name:  Brad Lau  MRN:   [id number]  YOB: 1975  PCP:    DIANA Amaya CNP    History Obtained From:     Patient, medical records    History of Present Illness: This is Brad Lau a 50 y.o. male who presents today for a colorectal cancer screening by Dr. Nestor Warren for Hx colon polyps, FH colon cancer. Patient followed bowel prep until watery clear. Previous colonoscopy 2/6/18 with polyps. FH colon cancer in mother age 43,  MGM and Maternal Aunt. Patient denies bowel changes, bloody tarry stools, diarrhea alternating with constipation, abdominal pain or unintentional weight loss. Last Ibuprofen week ago. Past Medical History:     Past Medical History:   Diagnosis Date    Colon polyps     Hypertension         Past Surgical History:     Past Surgical History:   Procedure Laterality Date    COLONOSCOPY      No polyps    COLONOSCOPY  02/06/2018    COLONOSCOPY  2/6/2018    COLONOSCOPY POLYPECTOMY HOT BIOPSY performed by Vladislav Wade MD at [address] [place] [address] Bilateral     Ear    ENDOSCOPY, COLON, DIAGNOSTIC      UPPER GASTROINTESTINAL ENDOSCOPY  02/06/2018    UPPER GASTROINTESTINAL ENDOSCOPY  2/6/2018    EGD POLYP HOT FORCEP/CAUTERY performed by Vladislav Wade MD at [address] [place] [address]        Medications Prior to Admission:     Prior to Admission medications    Medication Sig Start Date End Date Taking?  Authorizing Provider   esomeprazole Magnesium (NEXIUM) 20 MG PACK Take 20 mg by mouth daily   Yes Historical Provider, MD   losartan-hydrochlorothiazide (HYZAAR) 100-25 MG per tablet Take 1 tablet by mouth daily 6/22/20  Yes DIANA Clifford CNP   ibuprofen (ADVIL;MOTRIN) 200 MG tablet Take 200 mg by mouth every 6 hours as needed for Pain   Yes Historical Provider, MD Na Sulfate-K Sulfate-Mg Sulf (SUPREP BOWEL PREP KIT) 17.5-3.13-1.6 GM/177ML SOLN Use as directed per instructions provided by physician office 1/26/21   Josselyn Cruz MD   Omega-3 Fatty Acids (FISH OIL) 1000 MG CAPS Take 3,000 mg by mouth 3 times daily    Historical Provider, MD        Allergies:     Patient has no known allergies. Social History:     Tobacco:    reports that he has never smoked. He has never used smokeless tobacco.  Alcohol:      reports current alcohol use. Drug Use:  reports no history of drug use. Family History:     Family History   Adopted: Yes   Problem Relation Age of Onset    Colon Cancer Mother 43    Lung Cancer Maternal Aunt     Colon Cancer Maternal Grandmother     Colon Cancer Other        Review of Systems:     Positive and Negative as described in HPI. CONSTITUTIONAL:  negative for fevers, chills, sweats, fatigue, weight loss  HEENT:  negative for vision, hearing changes, runny nose, throat pain  RESPIRATORY:  negative for shortness of breath, cough, congestion, wheezing. CARDIOVASCULAR:  negative for chest pain, palpitations.   GASTROINTESTINAL: acid reflux controlled with medication and diet negative for nausea, vomiting, diarrhea, constipation, change in bowel habits, abdominal pain   GENITOURINARY:  negative for difficulty of urination, burning with urination, frequency   INTEGUMENT:  negative for rash, skin lesions, easy bruising   HEMATOLOGIC/LYMPHATIC:  negative for swelling/edema   ALLERGIC/IMMUNOLOGIC:  negative for urticaria , itching  ENDOCRINE:  negative increase in drinking, increase in urination, hot or cold intolerance  MUSCULOSKELETAL: mild arthralgia  negative joint pains, muscle aches, swelling of joints  NEUROLOGICAL:  negative for headaches, dizziness, lightheadedness, numbness, pain, tingling extremities  BEHAVIOR/PSYCH:  negative for depression, anxiety    Physical Exam: BP (!) 140/98   Pulse 81   Temp 97.8 °F (36.6 °C) (Temporal)   Resp 20   Ht 6' (1.829 m)   Wt 226 lb 10.1 oz (102.8 kg)   SpO2 97%   BMI 30.74 kg/m²   No results for input(s): POCGLU in the last 72 hours. General Appearance:  alert, well appearing, and in no acute distress  Mental status: oriented to person, place, and time with normal affect  Head:  normocephalic, atraumatic. Eye: no icterus, redness, pupils equal and reactive, extraocular eye movements intact, conjunctiva clear  Ear: normal external ear, no discharge, hearing intact  Nose:  no drainage noted  Mouth: mucous membranes moist  Neck: supple, no carotid bruits, thyroid not palpable  Lungs: Bilateral equal air entry, clear to ausculation, no wheezing, rales or rhonchi, normal effort  Cardiovascular: HR 81 normal rate, regular rhythm, no murmur, gallop, rub. Abdomen: Soft, nontender, nondistended, normal bowel sounds  Neurologic: There are no new focal motor or sensory deficits, normal muscle tone and bulk, no abnormal sensation, normal speech, cranial nerves II through XII grossly intact  Skin: No gross lesions, rashes, bruising or bleeding on exposed skin area  Extremities:  peripheral pulses palpable, no pedal edema or calf pain with palpation  Psych: normal affect     Investigations:      Laboratory Testing:  No results found for this or any previous visit (from the past 24 hour(s)). No results for input(s): HGB, HCT, WBC, MCV, PLATELET, NA, K, CL, CO2, BUN, CREATININE, GLUCOSE, INR, PROTIME, APTT, AST, ALT, LABALBU, HCG in the last 720 hours. Recent Labs     03/01/21  1540   COVID19       Not Detected     Imaging/Diagnostics:    No results found. Diagnosis:      1. Hx polyps, FH colon cancer    Plans:     1.  Colorectal cancer screening      DIANA Myers CNP  3/5/2021  7:20 AM

## 2021-03-05 NOTE — OP NOTE
Operative Note    PROCEDURE NOTE    DATE OF PROCEDURE: 3/5/2021    SURGEON: Sampson Macdonald MD  Facility : [facility] [address] GAITAN  ASSISTANT: None  Anesthesia: MAC  PREOPERATIVE DIAGNOSIS:   Family history of colon cancer  History of polyps    POSTOPERATIVE DIAGNOSIS: as described below    OPERATION: Total colonoscopy     ANESTHESIA: Moderate Sedation    ESTIMATED BLOOD LOSS: less than 50     COMPLICATIONS: None. SPECIMENS:  Was Not Obtained    HISTORY: The patient is a 50y.o. year old male with history of above preop diagnosis. I recommended colonoscopy with possible biopsy or polypectomy and I explained the risk, benefits, expected outcome, and alternatives to the procedure. Risks included but are not limited to bleeding, infection, respiratory distress, hypotension, and perforation of the colon and possibility of missing a lesion. The patient understands and is in agreement. The patient was counseled at length about the risks of bismark Covid-19 during their perioperative period and any recovery window from their procedure. The patient was made aware that bismark Covid-19  may worsen their prognosis for recovering from their procedure  and lend to a higher morbidity and/or mortality risk. All material risks, benefits, and reasonable alternatives including postponing the procedure were discussed. The patient does wish to proceed with the procedure at this time. PROCEDURE: The patient was given IV conscious sedation. The patient's SPO2 remained above 90% throughout the procedure. The colonoscope was inserted per rectum and advanced under direct vision to the cecum without difficulty. Post sedation note : The patient's SPO2 remained above 90% throughout the procedure. the vital signs remained stable , and no immediate complication form the procedure noted, patient will be ready for d/c when criteria is met . The prep was good.       Findings:  Terminal ileum: normal Cecum/Ascending colon: normal    Transverse colon: normal    Descending/Sigmoid colon: normal    Rectum/Anus: examined in normal and retroflexed positions and was normal    Withdrawal Time was (minutes): 10    The colon was decompressed and the scope was removed. The patient tolerated the procedure well. Recommendations/Plan:   1. Lifestyle and dietary modifications as discussed  2. F/U Biopsies  3. F/U In Office no  4. Discussed with the family  5.  Repeat colonoscopy [id number], because of the history of polyps in the past in addition to family history of colon cancer    Electronically signed by Geovani Suarez MD  on 3/5/2021 at 8:30 AM

## 2021-03-11 ENCOUNTER — TELEPHONE (OUTPATIENT)
Dept: GASTROENTEROLOGY | Age: 46
End: 2021-03-11

## 2021-03-11 NOTE — TELEPHONE ENCOUNTER
Patient does not want to come in for follow up appointment. Patient states that he is doing fine and understood that everything was okay with his colonoscopy. Patient will call if he has any problems, otherwise he will schedule another colonoscopy in 5 years.

## 2021-04-23 ENCOUNTER — NURSE TRIAGE (OUTPATIENT)
Dept: OTHER | Facility: CLINIC | Age: 46
End: 2021-04-23

## 2021-04-23 ENCOUNTER — OFFICE VISIT (OUTPATIENT)
Dept: FAMILY MEDICINE CLINIC | Age: 46
End: 2021-04-23
Payer: COMMERCIAL

## 2021-04-23 VITALS
BODY MASS INDEX: 30.61 KG/M2 | DIASTOLIC BLOOD PRESSURE: 89 MMHG | SYSTOLIC BLOOD PRESSURE: 137 MMHG | WEIGHT: 226 LBS | OXYGEN SATURATION: 98 % | HEIGHT: 72 IN | TEMPERATURE: 99.7 F | HEART RATE: 76 BPM | RESPIRATION RATE: 16 BRPM

## 2021-04-23 DIAGNOSIS — J06.9 VIRAL URI: Primary | ICD-10-CM

## 2021-04-23 PROCEDURE — 99212 OFFICE O/P EST SF 10 MIN: CPT | Performed by: NURSE PRACTITIONER

## 2021-04-23 ASSESSMENT — PATIENT HEALTH QUESTIONNAIRE - PHQ9
SUM OF ALL RESPONSES TO PHQ QUESTIONS 1-9: 0
1. LITTLE INTEREST OR PLEASURE IN DOING THINGS: 0

## 2021-04-23 ASSESSMENT — ENCOUNTER SYMPTOMS
SORE THROAT: 1
COUGH: 1
NAUSEA: 0
DIARRHEA: 0
CONSTIPATION: 0

## 2021-04-23 NOTE — PATIENT INSTRUCTIONS
Patient Education        Upper Respiratory Infection (Cold): Care Instructions  Your Care Instructions     An upper respiratory infection, or URI, is an infection of the nose, sinuses, or throat. URIs are spread by coughs, sneezes, and direct contact. The common cold is the most frequent kind of URI. The flu and sinus infections are other kinds of URIs. Almost all URIs are caused by viruses. Antibiotics won't cure them. But you can treat most infections with home care. This may include drinking lots of fluids and taking over-the-counter pain medicine. You will probably feel better in 4 to 10 days. The doctor has checked you carefully, but problems can develop later. If you notice any problems or new symptoms, get medical treatment right away. Follow-up care is a key part of your treatment and safety. Be sure to make and go to all appointments, and call your doctor if you are having problems. It's also a good idea to know your test results and keep a list of the medicines you take. How can you care for yourself at home? · To prevent dehydration, drink plenty of fluids. Choose water and other caffeine-free clear liquids until you feel better. If you have kidney, heart, or liver disease and have to limit fluids, talk with your doctor before you increase the amount of fluids you drink. · Take an over-the-counter pain medicine, such as acetaminophen (Tylenol), ibuprofen (Advil, Motrin), or naproxen (Aleve). Read and follow all instructions on the label. · Before you use cough and cold medicines, check the label. These medicines may not be safe for young children or for people with certain health problems. · Be careful when taking over-the-counter cold or flu medicines and Tylenol at the same time. Many of these medicines have acetaminophen, which is Tylenol. Read the labels to make sure that you are not taking more than the recommended dose. Too much acetaminophen (Tylenol) can be harmful.   · Get plenty of rest. · Do not smoke or allow others to smoke around you. If you need help quitting, talk to your doctor about stop-smoking programs and medicines. These can increase your chances of quitting for good. When should you call for help? Call 911 anytime you think you may need emergency care. For example, call if:    · You have severe trouble breathing. Call your doctor now or seek immediate medical care if:    · You seem to be getting much sicker.     · You have new or worse trouble breathing.     · You have a new or higher fever.     · You have a new rash. Watch closely for changes in your health, and be sure to contact your doctor if:    · You have a new symptom, such as a sore throat, an earache, or sinus pain.     · You cough more deeply or more often, especially if you notice more mucus or a change in the color of your mucus.     · You do not get better as expected. Where can you learn more? Go to [email or web address]://[email or web address]. org and sign in to your [employer] account. Enter M707 in the [employer] box to learn more about \"Upper Respiratory Infection (Cold): Care Instructions. \"     If you do not have an account, please click on the \"Sign Up Now\" link. Current as of: October 26, 2020               Content Version: 12.8  © 2006-2021 [employer]. Care instructions adapted under license by [employer] If you have questions about a medical condition or this instruction, always ask your healthcare professional. [place] [address] any warranty or liability for your use of this information.

## 2021-04-23 NOTE — TELEPHONE ENCOUNTER
Received call from [address] at [facility]) [place] with [employer] Complaint. Brief description of triage: See below    Triage indicates for patient to see PCP today or tomorrow in the office. Advised walk-in clinic if no available appts. Care advice provided, patient verbalizes understanding; denies any other questions or concerns; instructed to call back for any new or worsening symptoms. Writer provided warm transfer to [facility] [facility] for appointment scheduling. Attention Provider: Thank you for allowing me to participate in the care of your patient. The patient was connected to triage in response to information provided to the [facility]. Please do not respond through this encounter as the response is not directed to a shared pool. Reason for Disposition   Doesn't match the SYMPTOMS for nasal allergy   Patient wants to be seen    Answer Assessment - Initial Assessment Questions  1. SYMPTOM: \"What's the main symptom you're concerned about? \" (e.g., runny nose, stuffiness, sneezing, itching)      Post-nasal drip, stuffiness    2. SEVERITY: \"How bad is it? \" \"What does it keep you from doing? \" (e.g., sleeping, working)       MILD    3. EYES: \"Are the eyes also red, watery, and itchy? \"       Denies    4. TRIGGER: \"What pollen or other allergic substance do you think is causing the symptoms? \"       Unsure    5. TREATMENT: \"What medicine are you using? \" \"What medicine worked best in the past?\"      Motrin    6. OTHER SYMPTOMS: \"Do you have any other symptoms? \" (e.g., coughing, difficulty breathing, wheezing)      Occasional dry cough, fatigue, muscle aches    7. PREGNANCY: \"Is there any chance you are pregnant? \" \"When was your last menstrual period? \"      NA    Protocols used: NASAL ALLERGIES (HAY FEVER)-ADULT-[place], COMMON COLD-ADULT-[place]

## 2021-04-23 NOTE — PROGRESS NOTES
[address] WALK-IN  [facility] [address]     Date of Visit:  2021  Patient Name: Jeanne Baron   Patient :  1975     CHIEF COMPLAINT:     Jeanne Baron is a 48 y.o. male who presents today is c/o of Cough (symptoms started 2021), Congestion, and Fatigue          REVIEW OF SYSTEM      Review of Systems   Constitutional: Negative for activity change, appetite change, chills, fatigue and fever. HENT: Positive for congestion and sore throat. Respiratory: Positive for cough. Gastrointestinal: Negative for constipation, diarrhea and nausea. HISTORY OF PRESENT ILLNESS     Mike Barillas is being seen today for c/o cough, congestion and fatigue. He received his second COVID vaccine on 21. He went to work today and he began to feel fatigued, congested and his throat was sore. He also reports he has a history of seasonal allergies and started taking an antihistamine. He took a tylenol after he started feeling aches this morning. No further complaints. Recommended he stay off work the rest of today and rest, he does not need a note for work to return.        REVIEWED INFORMATION      No Known Allergies    Patient Active Problem List   Diagnosis    Low HDL (under 40)    High triglycerides    Gastroesophageal reflux disease without esophagitis    Family history of colon cancer in [facility] hypertension    Adenoma of cecum       Past Medical History:   Diagnosis Date    Colon polyps     Hypertension        Past Surgical History:   Procedure Laterality Date    COLONOSCOPY      No polyps    COLONOSCOPY  2018    COLONOSCOPY  2018    COLONOSCOPY POLYPECTOMY HOT BIOPSY performed by Joe Hoffman MD at [address] COLONOSCOPY N/A 3/5/2021    COLORECTAL CANCER SCREENING, NOT HIGH RISK performed by [facility] MD [facility] Bilateral     Ear    ENDOSCOPY, COLON, DIAGNOSTIC      UPPER GASTROINTESTINAL ENDOSCOPY  02/06/2018    UPPER GASTROINTESTINAL ENDOSCOPY  2/6/2018    EGD POLYP HOT FORCEP/CAUTERY performed by Rosangela Juárez MD at [facility]     There were no vitals taken for this visit. Physical Exam  Constitutional:       Appearance: Normal appearance. He is not ill-appearing. HENT:      Right Ear: Tympanic membrane, ear canal and external ear normal.      Left Ear: Tympanic membrane, ear canal and external ear normal.      Mouth/Throat:      Mouth: Mucous membranes are moist.      Pharynx: Posterior oropharyngeal erythema present. No oropharyngeal exudate. Comments: Post nasal drainage. Cardiovascular:      Heart sounds: Normal heart sounds. Pulmonary:      Breath sounds: Normal breath sounds. Abdominal:      General: Bowel sounds are normal.   Musculoskeletal: Normal range of motion. Skin:     General: Skin is warm and dry. Neurological:      Mental Status: He is alert. ASSESSMENT/PLAN     1. Viral URI  Continue antihistamine, tylenol as needed for aches and fever  Off work until Monday    Return if symptoms worsen or fail to improve.     COMMUNICATION:       Electronically signed by DIANA Lynn CNP on 4/23/2021 at 10:09 AM

## 2021-04-26 RX ORDER — BENZONATATE 100 MG/1
100 CAPSULE ORAL 3 TIMES DAILY PRN
Qty: 60 CAPSULE | Refills: 1 | Status: SHIPPED | OUTPATIENT
Start: 2021-04-26 | End: 2021-09-10

## 2021-06-14 DIAGNOSIS — I10 ESSENTIAL HYPERTENSION: ICD-10-CM

## 2021-06-15 RX ORDER — LOSARTAN POTASSIUM AND HYDROCHLOROTHIAZIDE 25; 100 MG/1; MG/1
TABLET ORAL
Qty: 90 TABLET | Refills: 3 | Status: SHIPPED | OUTPATIENT
Start: 2021-06-15 | End: 2022-06-09 | Stop reason: SDUPTHER

## 2021-09-10 ENCOUNTER — OFFICE VISIT (OUTPATIENT)
Dept: PRIMARY CARE CLINIC | Age: 46
End: 2021-09-10
Payer: COMMERCIAL

## 2021-09-10 ENCOUNTER — HOSPITAL ENCOUNTER (OUTPATIENT)
Age: 46
Setting detail: SPECIMEN
Discharge: HOME | End: 2021-09-10
Payer: COMMERCIAL

## 2021-09-10 VITALS
BODY MASS INDEX: 31.31 KG/M2 | HEIGHT: 72 IN | WEIGHT: 231.2 LBS | DIASTOLIC BLOOD PRESSURE: 70 MMHG | SYSTOLIC BLOOD PRESSURE: 120 MMHG | HEART RATE: 89 BPM | OXYGEN SATURATION: 97 %

## 2021-09-10 DIAGNOSIS — Z00.00 ENCOUNTER FOR GENERAL ADULT MEDICAL EXAMINATION W/O ABNORMAL FINDINGS: Primary | ICD-10-CM

## 2021-09-10 DIAGNOSIS — Z00.00 ENCOUNTER FOR GENERAL ADULT MEDICAL EXAMINATION W/O ABNORMAL FINDINGS: ICD-10-CM

## 2021-09-10 LAB
ALBUMIN SERPL-MCNC: 4.5 G/DL (ref 3.5–5.2)
ALBUMIN/GLOBULIN RATIO: 1.5 (ref 1–2.5)
ALP BLD-CCNC: 76 U/L (ref 40–129)
ALT SERPL-CCNC: 20 U/L (ref 5–41)
ANION GAP SERPL CALCULATED.3IONS-SCNC: 15 MMOL/L (ref 9–17)
AST SERPL-CCNC: 21 U/L
BILIRUB SERPL-MCNC: 0.43 MG/DL (ref 0.3–1.2)
BUN BLDV-MCNC: 19 MG/DL (ref 6–20)
BUN/CREAT BLD: NORMAL (ref 9–20)
CALCIUM SERPL-MCNC: 9.9 MG/DL (ref 8.6–10.4)
CHLORIDE BLD-SCNC: 103 MMOL/L (ref 98–107)
CHOLESTEROL/HDL RATIO: 3.9
CHOLESTEROL: 160 MG/DL
CO2: 24 MMOL/L (ref 20–31)
CREAT SERPL-MCNC: 1.09 MG/DL (ref 0.7–1.2)
GFR AFRICAN AMERICAN: >60 ML/MIN
GFR NON-AFRICAN AMERICAN: >60 ML/MIN
GFR SERPL CREATININE-BSD FRML MDRD: NORMAL ML/MIN/{1.73_M2}
GFR SERPL CREATININE-BSD FRML MDRD: NORMAL ML/MIN/{1.73_M2}
GLUCOSE BLD-MCNC: 88 MG/DL (ref 70–99)
GLUCOSE BLD-MCNC: 89 MG/DL (ref 70–99)
HDLC SERPL-MCNC: 41 MG/DL
LDL CHOLESTEROL: 95 MG/DL (ref 0–130)
PATIENT FASTING?: NORMAL
POTASSIUM SERPL-SCNC: 4.2 MMOL/L (ref 3.7–5.3)
SODIUM BLD-SCNC: 142 MMOL/L (ref 135–144)
TOTAL PROTEIN: 7.5 G/DL (ref 6.4–8.3)
TRIGL SERPL-MCNC: 122 MG/DL
VLDLC SERPL CALC-MCNC: NORMAL MG/DL (ref 1–30)

## 2021-09-10 PROCEDURE — 90471 IMMUNIZATION ADMIN: CPT | Performed by: NURSE PRACTITIONER

## 2021-09-10 PROCEDURE — 99396 PREV VISIT EST AGE 40-64: CPT | Performed by: NURSE PRACTITIONER

## 2021-09-10 PROCEDURE — 90674 CCIIV4 VAC NO PRSV 0.5 ML IM: CPT | Performed by: NURSE PRACTITIONER

## 2021-09-10 SDOH — ECONOMIC STABILITY: FOOD INSECURITY: WITHIN THE PAST 12 MONTHS, YOU WORRIED THAT YOUR FOOD WOULD RUN OUT BEFORE YOU GOT MONEY TO BUY MORE.: NEVER TRUE

## 2021-09-10 SDOH — ECONOMIC STABILITY: FOOD INSECURITY: WITHIN THE PAST 12 MONTHS, THE FOOD YOU BOUGHT JUST DIDN'T LAST AND YOU DIDN'T HAVE MONEY TO GET MORE.: NEVER TRUE

## 2021-09-10 ASSESSMENT — PATIENT HEALTH QUESTIONNAIRE - PHQ9
SUM OF ALL RESPONSES TO PHQ9 QUESTIONS 1 & 2: 0
1. LITTLE INTEREST OR PLEASURE IN DOING THINGS: 0
SUM OF ALL RESPONSES TO PHQ QUESTIONS 1-9: 0
SUM OF ALL RESPONSES TO PHQ QUESTIONS 1-9: 0
2. FEELING DOWN, DEPRESSED OR HOPELESS: 0
SUM OF ALL RESPONSES TO PHQ QUESTIONS 1-9: 0

## 2021-09-10 ASSESSMENT — ENCOUNTER SYMPTOMS
COUGH: 0
BACK PAIN: 0
SHORTNESS OF BREATH: 0
ABDOMINAL PAIN: 0

## 2021-09-10 ASSESSMENT — SOCIAL DETERMINANTS OF HEALTH (SDOH): HOW HARD IS IT FOR YOU TO PAY FOR THE VERY BASICS LIKE FOOD, HOUSING, MEDICAL CARE, AND HEATING?: NOT HARD AT ALL

## 2021-09-10 NOTE — PROGRESS NOTES
[address] PRIMARY CARE  [address] [facility] [address]  [address] [address]  Dept: [phone]  Dept Fax: [phone]    Adrianne Flor is a 48 y.o. male who presentstoday for his medical conditions/complaints as noted below.   Adrianne Flor is c/o of  Chief Complaint   Patient presents with    Annual Exam       HPI:     Presents for annual exam/Be Well  BP well controlled today, does not monitor regularly at home  He has lost 4lb since visit 2020  Working out regularly  Has on heavy boots today as well    Willing to update annual labs  Updated colonoscopy, next to be repeated in 5 years  States he is planning to move to [place], will be there for next colonoscopy  Overall feeling well    Denies any other problems/concerns      No results found for: LABA1C          ( goal A1C is < 7)   No results found for: LABMICR  LDL Cholesterol (mg/dL)   Date Value   09/10/2020 97   2019 78   2018 103       (goal LDL is <100)   AST (U/L)   Date Value   2018 18     ALT (U/L)   Date Value   2018 17     BUN (mg/dL)   Date Value   2018 15     BP Readings from Last 3 Encounters:   09/10/21 120/70   21 137/89   21 (!) 118/90          ([id number])    Past Medical History:   Diagnosis Date    Colon polyps     Hypertension       Past Surgical History:   Procedure Laterality Date    COLONOSCOPY      No polyps    COLONOSCOPY  2018    COLONOSCOPY  2018    COLONOSCOPY POLYPECTOMY HOT BIOPSY performed by Gustavo Paz MD at [address] [facility] N/A 3/5/2021    COLORECTAL CANCER SCREENING, NOT HIGH RISK performed by Gustavo Paz MD at [address] [facility] Bilateral     Ear    ENDOSCOPY, COLON, DIAGNOSTIC      UPPER GASTROINTESTINAL ENDOSCOPY  2018    UPPER GASTROINTESTINAL ENDOSCOPY  2018    EGD POLYP HOT FORCEP/CAUTERY performed by Gustavo Paz MD at [facility] History   Adopted: Yes   Problem Relation Age of Onset    Physical Exam  Vitals and nursing note reviewed. Constitutional:       Appearance: He is well-developed. HENT:      Head: Normocephalic and atraumatic. Eyes:      Pupils: Pupils are equal, round, and reactive to light. Cardiovascular:      Rate and Rhythm: Normal rate and regular rhythm. Heart sounds: Normal heart sounds. Pulmonary:      Effort: Pulmonary effort is normal.      Breath sounds: Normal breath sounds. Abdominal:      General: Bowel sounds are normal.      Palpations: Abdomen is soft. Tenderness: There is no abdominal tenderness. Musculoskeletal:         General: Normal range of motion. Cervical back: Normal range of motion. Skin:     General: Skin is warm and dry. Neurological:      Mental Status: He is alert and oriented to person, place, and time. Psychiatric:         Behavior: Behavior normal.         Thought Content: Thought content normal.         Judgment: Judgment normal.       /70 (Site: Left Upper Arm, Position: Sitting, Cuff Size: Medium Adult)   Pulse 89   Ht 6' (1.829 m)   Wt 231 lb 3.2 oz (104.9 kg)   SpO2 97%   BMI 31.36 kg/m²     Assessment:       Diagnosis Orders   1. Encounter for general adult medical examination w/o abnormal findings  Be Well Health Screen    Comprehensive Metabolic Panel    INFLUENZA, MDCK QUADV, 2 YRS AND OLDER, IM, PF, PREFILL SYR OR SDV, 0.5ML (FLUCELVAX QUADV, PF)             Plan:      Return in about 1 year (around 9/10/2022) for annual exam.    1. - Encouraged diet/exercise. Continue current meds. Rx given for annual labs. Flu vaccine updated. Follow up in one year/earlier if needed. Orders Placed This Encounter   Procedures    INFLUENZA, MDCK QUADV, 2 YRS AND OLDER, IM, PF, PREFILL SYR OR SDV, 0.5ML (FLUCELVAX QUADV, PF)    Be Well Health Screen     Patient needs to be fasting at least 8-12 hours.   Labs included in this order panel:    - Glucose  - Lipid Panel (Total Cholesterol, Triglycerides, HDL, LDL Calculated)  - Nicotine & Cotinine Levels       Standing Status:   Future     Standing Expiration Date:   9/10/2022    Comprehensive Metabolic Panel     Standing Status:   Future     Standing Expiration Date:   9/10/2022        Patient given educational materials - see patient instructions. Discussed use, benefit, and side effects of prescribed medications. All patientquestions answered. Pt voiced understanding. Reviewed health maintenance. Instructedto continue current medications, diet and exercise. Patient agreed with treatmentplan. Follow up as directed.      Electronicallysigned by Colby Favre, APRN - CNP on 9/10/2021 at 8:32 AM

## 2021-12-13 ENCOUNTER — OFFICE VISIT (OUTPATIENT)
Dept: FAMILY MEDICINE CLINIC | Age: 46
End: 2021-12-13
Payer: COMMERCIAL

## 2021-12-13 VITALS
TEMPERATURE: 98.3 F | WEIGHT: 231 LBS | BODY MASS INDEX: 31.33 KG/M2 | RESPIRATION RATE: 16 BRPM | OXYGEN SATURATION: 100 % | HEART RATE: 64 BPM | DIASTOLIC BLOOD PRESSURE: 70 MMHG | SYSTOLIC BLOOD PRESSURE: 124 MMHG

## 2021-12-13 DIAGNOSIS — H10.32 ACUTE CONJUNCTIVITIS OF LEFT EYE, UNSPECIFIED ACUTE CONJUNCTIVITIS TYPE: Primary | ICD-10-CM

## 2021-12-13 PROCEDURE — 99212 OFFICE O/P EST SF 10 MIN: CPT | Performed by: NURSE PRACTITIONER

## 2021-12-13 RX ORDER — GENTAMICIN SULFATE 3 MG/ML
1 SOLUTION/ DROPS OPHTHALMIC 4 TIMES DAILY
Qty: 15 ML | Refills: 0 | Status: SHIPPED | OUTPATIENT
Start: 2021-12-13 | End: 2022-01-05 | Stop reason: SDUPTHER

## 2021-12-13 ASSESSMENT — ENCOUNTER SYMPTOMS
EYE DISCHARGE: 1
PHOTOPHOBIA: 0
EYE REDNESS: 1
EYE PAIN: 1
RHINORRHEA: 0

## 2021-12-13 NOTE — PATIENT INSTRUCTIONS
inside the lower lid. ? Close your eye for 30 to 60 seconds to let the drops or ointment move around. ? Do not touch the ointment or dropper tip to your eyelashes or any other surface. · Do not share towels, pillows, or washcloths while you have pinkeye. When should you call for help? Call your doctor now or seek immediate medical care if:    · You have pain in your eye, not just irritation on the surface.     · You have a change in vision or loss of vision.     · You have an increase in discharge from the eye.     · Your eye has not started to improve or begins to get worse within 48 hours after you start using antibiotics.     · Pinkeye lasts longer than 7 days. Watch closely for changes in your health, and be sure to contact your doctor if you have any problems. Where can you learn more? Go to [email or web address] org and sign in to your [employer] account. Enter Y392 in the PenBlade box to learn more about \"Pinkeye: Care Instructions. \"     If you do not have an account, please click on the \"Sign Up Now\" link. Current as of: July 1, 2021               Content Version: 13.0  © 0190-6877 Healthwise, Incorporated. Care instructions adapted under license by [facility] ([facility]). If you have questions about a medical condition or this instruction, always ask your healthcare professional. [place] [address] any warranty or liability for your use of this information.

## 2021-12-13 NOTE — PROGRESS NOTES
[address] WALK-IN  [address] [place]  Dept: [phone]  Dept Fax: [phone]    Geeta Yañez is a 48 y.o. male who presents today for his medical conditions/complaints of   Chief Complaint   Patient presents with    Eye Drainage     R eye drainage and tearing up for 3 weeks. HPI:     /70   Pulse 64   Temp 98.3 °F (36.8 °C) (Temporal)   Resp 16   Wt 231 lb (104.8 kg)   SpO2 100%   BMI 31.33 kg/m²       HPI  Pt presented to the urgent care today with c/o left eye problem. The current episode started 3 weeks ago The problem has been waxing and waning since onset. There was no injury mechanism. The pain is mild. There is no known exposure to pink eye. He does not wear contacts.(usually does but has not for several weeks)  Associated symptoms include blurred vision, an eye discharge and eye redness. Matting noted on lashes in AM. Pertinent negatives include no fever or foreign body sensation. He has tried nothing for the symptoms.          Past Medical History:   Diagnosis Date    Colon polyps     Hypertension         Past Surgical History:   Procedure Laterality Date    COLONOSCOPY      No polyps    COLONOSCOPY  02/06/2018    COLONOSCOPY  2/6/2018    COLONOSCOPY POLYPECTOMY HOT BIOPSY performed by Vanessa Crowell MD at [address] COLONOSCOPY N/A 3/5/2021    COLORECTAL CANCER SCREENING, NOT HIGH RISK [facility] Bilateral     Ear    ENDOSCOPY, COLON, DIAGNOSTIC      UPPER GASTROINTESTINAL ENDOSCOPY  02/06/2018    UPPER GASTROINTESTINAL ENDOSCOPY  2/6/2018    EGD POLYP HOT FORCEP/CAUTERY performed by Vanessa Crowell MD at [address] [facility] History   Adopted: Yes   Problem Relation Age of Onset    Colon Cancer Mother 43    Lung Cancer Maternal Aunt     Colon Cancer Maternal Grandmother     Colon Cancer Other        Social History     Tobacco Use    Smoking status: Never Smoker    Smokeless tobacco: Never Used   Substance Use Topics    Alcohol use: Yes     Comment: 2 times month        Prior to Visit Medications    Medication Sig Taking? Authorizing Provider   gentamicin (GARAMYCIN) 0.3 % ophthalmic solution Place 1 drop into the left eye 4 times daily for 7 days Yes DIANA Holder - CNP   losartan-hydroCHLOROthiazide (HYZAAR) 100-25 MG per tablet TAKE 1 TABLET BY MOUTH ONE TIME A DAY  DIANA Stanton - CNP   esomeprazole Magnesium (NEXIUM) 20 MG PACK Take 20 mg by mouth daily  Historical Provider, MD   ibuprofen (ADVIL;MOTRIN) 200 MG tablet Take 200 mg by mouth every 6 hours as needed for Pain  Historical Provider, MD       No Known Allergies      Subjective:      Review of Systems   Constitutional: Negative for chills and fever. HENT: Negative for congestion and rhinorrhea. Eyes: Positive for pain, discharge, redness and visual disturbance. Negative for photophobia. Skin: Negative for pallor. Neurological: Negative for light-headedness and headaches. Objective:     Physical Exam  Vitals and nursing note reviewed. Constitutional:       General: He is not in acute distress. Appearance: Normal appearance. HENT:      Head: Normocephalic and atraumatic. Right Ear: Ear canal and external ear normal.      Left Ear: Ear canal and external ear normal.      Nose: Nose normal.      Mouth/Throat:      Mouth: Mucous membranes are moist.   Eyes:      General: Lids are normal.      Conjunctiva/sclera:      Left eye: Left conjunctiva is injected. Exudate present. Pupils: Pupils are equal, round, and reactive to light. Cardiovascular:      Rate and Rhythm: Normal rate. Pulses: Normal pulses. Musculoskeletal:      Cervical back: Normal range of motion and neck supple. Skin:     General: Skin is warm and dry. Capillary Refill: Capillary refill takes less than 2 seconds.    Neurological:      Mental Status: He is alert and oriented to person, place, and time. Psychiatric:         Mood and Affect: Mood normal.         Behavior: Behavior normal.           MEDICAL DECISION MAKING Assessment/Plan:     Milton Leo was seen today for eye drainage. Diagnoses and all orders for this visit:    Acute conjunctivitis of left eye, unspecified acute conjunctivitis type  -     gentamicin (GARAMYCIN) 0.3 % ophthalmic solution; Place 1 drop into the left eye 4 times daily for 7 days        Results for orders placed or performed during the hospital encounter of 09/10/21   Be Well Health Screen   Result Value Ref Range    Glucose 89 70 - 99 mg/dL    Cholesterol 160 <200 mg/dL    HDL 41 >40 mg/dL    LDL Cholesterol 95 0 - 130 mg/dL    Chol/HDL Ratio 3.9 <5    Triglycerides 122 <150 mg/dL    VLDL NOT REPORTED 1 - 30 mg/dL    Patient Fasting? Patient fasting    Comprehensive Metabolic Panel   Result Value Ref Range    Glucose 88 70 - 99 mg/dL    BUN 19 6 - 20 mg/dL    CREATININE 1.09 0.70 - 1.20 mg/dL    Bun/Cre Ratio NOT REPORTED 9 - 20    Calcium 9.9 8.6 - 10.4 mg/dL    Sodium 142 135 - 144 mmol/L    Potassium 4.2 3.7 - 5.3 mmol/L    Chloride 103 98 - 107 mmol/L    CO2 24 20 - 31 mmol/L    Anion Gap 15 9 - 17 mmol/L    Alkaline Phosphatase 76 40 - 129 U/L    ALT 20 5 - 41 U/L    AST 21 <40 U/L    Total Bilirubin 0.43 0.3 - 1.2 mg/dL    Total Protein 7.5 6.4 - 8.3 g/dL    Albumin 4.5 3.5 - 5.2 g/dL    Albumin/Globulin Ratio 1.5 1.0 - 2.5    GFR Non-African American >60 >60 mL/min    GFR African American >60 >60 mL/min    GFR Comment          GFR Staging NOT REPORTED      Based on the history and exam, will treat as conjunctivitis. Pt instructed:  Apply the antibiotic gentamicin drops daily as directed on the label until your symptoms have been gone for a full 24 hours. If you are using the drops for more than 7 days, make sure that you have a follow-up appointment scheduled for reevaluation with your eye doctor.    Please wash your hands frequently and avoid touching your eyes and face, as this can be contagious. Go to the ER as needed for any concerning symptoms such as chest pain, shortness of breath severe nausea, vomiting, fevers, chills, worsening, or blurry vision, blindness, inability to move your eye or any other concerning symptoms. Patient given educational materials - see patientinstructions. Discussed use, benefit, and side effects of prescribed medications. All patient questions answered. Pt verbalized understanding. Instructed to continue current medications, diet and exercise. Patient agreed with treatment plan. Follow up as directed.      Electronically signed by DIANA Mccullough CNP on 12/13/2021 at 11:31 AM

## 2021-12-27 ENCOUNTER — PATIENT MESSAGE (OUTPATIENT)
Dept: PRIMARY CARE CLINIC | Age: 46
End: 2021-12-27

## 2021-12-27 RX ORDER — METHYLPREDNISOLONE 4 MG/1
TABLET ORAL
Qty: 1 KIT | Refills: 0 | Status: SHIPPED | OUTPATIENT
Start: 2021-12-27 | End: 2022-01-02

## 2021-12-27 NOTE — TELEPHONE ENCOUNTER
From: Tony Poll  To: Story Tita  Sent: 12/27/2021 11:56 AM EST  Subject: Steroid     Hello, I have had pain in my right shoulder for some time. Comes and goes, can I have a steroid prescribed and see if that helps? Its kind of the same issue my wife Sam Dubose is having.    Thanks Chuckie Kussmaul

## 2022-01-05 DIAGNOSIS — H10.32 ACUTE CONJUNCTIVITIS OF LEFT EYE, UNSPECIFIED ACUTE CONJUNCTIVITIS TYPE: ICD-10-CM

## 2022-01-05 RX ORDER — GENTAMICIN SULFATE 3 MG/ML
1 SOLUTION/ DROPS OPHTHALMIC 4 TIMES DAILY
Qty: 15 ML | Refills: 0 | Status: SHIPPED | OUTPATIENT
Start: 2022-01-05 | End: 2022-01-12

## 2022-06-09 DIAGNOSIS — I10 ESSENTIAL HYPERTENSION: ICD-10-CM

## 2022-06-09 RX ORDER — LOSARTAN POTASSIUM AND HYDROCHLOROTHIAZIDE 25; 100 MG/1; MG/1
1 TABLET ORAL DAILY
Qty: 90 TABLET | Refills: 1 | Status: SHIPPED | OUTPATIENT
Start: 2022-06-09

## 2022-12-06 DIAGNOSIS — I10 ESSENTIAL HYPERTENSION: ICD-10-CM

## 2022-12-06 RX ORDER — LOSARTAN POTASSIUM AND HYDROCHLOROTHIAZIDE 25; 100 MG/1; MG/1
1 TABLET ORAL DAILY
Qty: 90 TABLET | Refills: 1 | Status: SHIPPED | OUTPATIENT
Start: 2022-12-06

## 2022-12-10 DIAGNOSIS — I10 ESSENTIAL HYPERTENSION: ICD-10-CM

## 2022-12-12 RX ORDER — LOSARTAN POTASSIUM AND HYDROCHLOROTHIAZIDE 25; 100 MG/1; MG/1
TABLET ORAL
Qty: 90 TABLET | Refills: 1 | Status: SHIPPED | OUTPATIENT
Start: 2022-12-12

## 2022-12-12 RX ORDER — LOSARTAN POTASSIUM AND HYDROCHLOROTHIAZIDE 25; 100 MG/1; MG/1
1 TABLET ORAL DAILY
Qty: 90 TABLET | Refills: 1 | Status: SHIPPED | OUTPATIENT
Start: 2022-12-12

## 2022-12-29 ENCOUNTER — OFFICE VISIT (OUTPATIENT)
Dept: FAMILY MEDICINE CLINIC | Age: 47
End: 2022-12-29
Payer: COMMERCIAL

## 2022-12-29 VITALS
RESPIRATION RATE: 16 BRPM | SYSTOLIC BLOOD PRESSURE: 136 MMHG | BODY MASS INDEX: 31.19 KG/M2 | OXYGEN SATURATION: 96 % | WEIGHT: 230 LBS | TEMPERATURE: 97.7 F | HEART RATE: 80 BPM | DIASTOLIC BLOOD PRESSURE: 84 MMHG

## 2022-12-29 DIAGNOSIS — J06.9 ACUTE URI: Primary | ICD-10-CM

## 2022-12-29 PROCEDURE — 3074F SYST BP LT 130 MM HG: CPT | Performed by: REGISTERED NURSE

## 2022-12-29 PROCEDURE — 3078F DIAST BP <80 MM HG: CPT | Performed by: REGISTERED NURSE

## 2022-12-29 PROCEDURE — 99213 OFFICE O/P EST LOW 20 MIN: CPT | Performed by: REGISTERED NURSE

## 2022-12-29 RX ORDER — AZITHROMYCIN 250 MG/1
250 TABLET, FILM COATED ORAL SEE ADMIN INSTRUCTIONS
Qty: 6 TABLET | Refills: 0 | Status: SHIPPED | OUTPATIENT
Start: 2022-12-29 | End: 2023-01-03

## 2022-12-29 RX ORDER — BENZONATATE 200 MG/1
200 CAPSULE ORAL 3 TIMES DAILY PRN
Qty: 30 CAPSULE | Refills: 0 | Status: SHIPPED | OUTPATIENT
Start: 2022-12-29 | End: 2023-01-08

## 2022-12-29 RX ORDER — METHYLPREDNISOLONE 4 MG/1
TABLET ORAL
Qty: 1 KIT | Refills: 0 | Status: SHIPPED | OUTPATIENT
Start: 2022-12-29 | End: 2023-01-04

## 2022-12-29 SDOH — ECONOMIC STABILITY: FOOD INSECURITY: WITHIN THE PAST 12 MONTHS, YOU WORRIED THAT YOUR FOOD WOULD RUN OUT BEFORE YOU GOT MONEY TO BUY MORE.: NEVER TRUE

## 2022-12-29 SDOH — ECONOMIC STABILITY: FOOD INSECURITY: WITHIN THE PAST 12 MONTHS, THE FOOD YOU BOUGHT JUST DIDN'T LAST AND YOU DIDN'T HAVE MONEY TO GET MORE.: NEVER TRUE

## 2022-12-29 ASSESSMENT — SOCIAL DETERMINANTS OF HEALTH (SDOH): HOW HARD IS IT FOR YOU TO PAY FOR THE VERY BASICS LIKE FOOD, HOUSING, MEDICAL CARE, AND HEATING?: NOT HARD AT ALL

## 2022-12-29 ASSESSMENT — ENCOUNTER SYMPTOMS
EYES NEGATIVE: 1
WHEEZING: 0
SHORTNESS OF BREATH: 1
COUGH: 1
HEMOPTYSIS: 0
GASTROINTESTINAL NEGATIVE: 1

## 2022-12-29 ASSESSMENT — PATIENT HEALTH QUESTIONNAIRE - PHQ9
SUM OF ALL RESPONSES TO PHQ QUESTIONS 1-9: 0
1. LITTLE INTEREST OR PLEASURE IN DOING THINGS: 0
SUM OF ALL RESPONSES TO PHQ QUESTIONS 1-9: 0
2. FEELING DOWN, DEPRESSED OR HOPELESS: 0
SUM OF ALL RESPONSES TO PHQ9 QUESTIONS 1 & 2: 0

## 2022-12-29 NOTE — PROGRESS NOTES
[address] [facility]  [address]  [address] [address]  Dept: [phone]  Dept Fax: [phone]    Neftaly Weaver is a 50 y.o. male who presents today for his medical conditions/complaints of   Chief Complaint   Patient presents with    Cough     Since around the 20th          HPI:     /84   Pulse 80   Temp 97.7 °F (36.5 °C)   Resp 16   Wt 230 lb (104.3 kg)   SpO2 96%   BMI 31.19 kg/m²       Cough  This is a new problem. The current episode started 1 to 4 weeks ago. The problem has been gradually worsening. Episode frequency: worse at night. The cough is Non-productive. Associated symptoms include shortness of breath. Pertinent negatives include no chest pain, chills, fever, hemoptysis, sweats or wheezing. The symptoms are aggravated by lying down. He has tried OTC cough suppressant (Lef tover oral Amoxicillin) for the symptoms. His past medical history is significant for bronchitis.      Past Medical History:   Diagnosis Date    Colon polyps     Hypertension         Past Surgical History:   Procedure Laterality Date    COLONOSCOPY      No polyps    COLONOSCOPY  02/06/2018    COLONOSCOPY  2/6/2018    COLONOSCOPY POLYPECTOMY HOT BIOPSY performed by Tamera Davis MD at [address] [place] [address] N/A 3/5/2021    COLORECTAL CANCER SCREENING, NOT HIGH RISK performed by Tamera Daivs MD at [address] [place] [address] Bilateral     Ear    ENDOSCOPY, COLON, DIAGNOSTIC      UPPER GASTROINTESTINAL ENDOSCOPY  02/06/2018    UPPER GASTROINTESTINAL ENDOSCOPY  2/6/2018    EGD POLYP HOT FORCEP/CAUTERY performed by Tamera Davis MD at [place] [address] History   Adopted: Yes   Problem Relation Age of Onset    Colon Cancer Mother 43    Lung Cancer Maternal Aunt     Colon Cancer Maternal Grandmother     Colon Cancer Other        Social History     Tobacco Use    Smoking status: Never    Smokeless tobacco: Never   Substance Use Topics Alcohol use: Yes     Comment: 2 times month        Prior to Visit Medications    Medication Sig Taking? Authorizing Provider   azithromycin (ZITHROMAX) 250 MG tablet Take 1 tablet by mouth See Admin Instructions for 5 days 500mg on day 1 followed by 250mg on days 2 - 5 Yes DIANA Vivar CNP   benzonatate (TESSALON) 200 MG capsule Take 1 capsule by mouth 3 times daily as needed for Cough Yes DIANA Vivar CNP   methylPREDNISolone (MEDROL DOSEPACK) 4 MG tablet Take by mouth. Yes DIANA Vivar CNP   losartan-hydroCHLOROthiazide (HYZAAR) 100-25 MG per tablet TAKE ONE TABLET BY MOUTH DAILY Yes DIANA Lozano CNP   losartan-hydroCHLOROthiazide (HYZAAR) 100-25 MG per tablet Take 1 tablet by mouth daily Yes DIANA Lozano CNP   esomeprazole Magnesium (NEXIUM) 20 MG PACK Take 20 mg by mouth daily Yes Historical Provider, MD   ibuprofen (ADVIL;MOTRIN) 200 MG tablet Take 200 mg by mouth every 6 hours as needed for Pain Yes Historical Provider, MD       No Known Allergies      Subjective:      Review of Systems   Constitutional:  Negative for chills and fever. HENT: Negative. Eyes: Negative. Respiratory:  Positive for cough and shortness of breath. Negative for hemoptysis and wheezing. Cardiovascular:  Negative for chest pain, palpitations and leg swelling. Gastrointestinal: Negative. Genitourinary: Negative. Musculoskeletal: Negative. Skin: Negative. Neurological: Negative. Objective:     Physical Exam  Constitutional:       General: He is not in acute distress. Appearance: Normal appearance. He is normal weight. He is not ill-appearing, toxic-appearing or diaphoretic. HENT:      Head: Normocephalic. Right Ear: Tympanic membrane and ear canal normal.      Left Ear: Tympanic membrane and ear canal normal.      Nose: Nose normal.      Mouth/Throat:      Mouth: Mucous membranes are moist.      Pharynx: Oropharynx is clear.  No oropharyngeal exudate or posterior oropharyngeal erythema. Cardiovascular:      Rate and Rhythm: Normal rate and regular rhythm. Heart sounds: Normal heart sounds. Pulmonary:      Effort: Pulmonary effort is normal. No respiratory distress. Breath sounds: Normal breath sounds. No wheezing, rhonchi or rales. Skin:     General: Skin is warm. Coloration: Skin is not pale. Neurological:      General: No focal deficit present. Mental Status: He is alert. Psychiatric:         Mood and Affect: Mood normal.         MEDICAL DECISION MAKING Assessment/Plan:     Geraldine العراقي was seen today for cough. Diagnoses and all orders for this visit:    Acute URI  -     azithromycin (ZITHROMAX) 250 MG tablet; Take 1 tablet by mouth See Admin Instructions for 5 days 500mg on day 1 followed by 250mg on days 2 - 5  -     benzonatate (TESSALON) 200 MG capsule; Take 1 capsule by mouth 3 times daily as needed for Cough  -     methylPREDNISolone (MEDROL DOSEPACK) 4 MG tablet; Take by mouth. Due to symptoms tor URI that are worsening will treat with zpack, medrol dose pack and tessalon perles. Rest, increase fluids. You may take ibuprofen or Tylenol as directed on the bottle for fever, headache, sore throat or pain. Please fill and take the medication as directed on the bottle for the full duration as prescribed. Even if you are feeling better please do not discontinue the medication. If your symptoms worsen over the next 3 days return to the urgent care or follow up with PCP. If you develop any shortness of breath, chest pain or any other emergent concerning symptoms please go to the emergency room.     Results for orders placed or performed during the hospital encounter of 09/10/21   [facility] Health Screen   Result Value Ref Range    Glucose 89 70 - 99 mg/dL    Cholesterol 160 <200 mg/dL    HDL 41 >40 mg/dL    LDL Cholesterol 95 0 - 130 mg/dL    Chol/HDL Ratio 3.9 <5    Triglycerides 122 <150 mg/dL    VLDL NOT REPORTED 1 - 30 mg/dL    Patient Fasting? Patient fasting    Comprehensive Metabolic Panel   Result Value Ref Range    Glucose 88 70 - 99 mg/dL    BUN 19 6 - 20 mg/dL    Creatinine 1.09 0.70 - 1.20 mg/dL    Bun/Cre Ratio NOT REPORTED 9 - 20    Calcium 9.9 8.6 - 10.4 mg/dL    Sodium 142 135 - 144 mmol/L    Potassium 4.2 3.7 - 5.3 mmol/L    Chloride 103 98 - 107 mmol/L    CO2 24 20 - 31 mmol/L    Anion Gap 15 9 - 17 mmol/L    Alkaline Phosphatase 76 40 - 129 U/L    ALT 20 5 - 41 U/L    AST 21 <40 U/L    Total Bilirubin 0.43 0.3 - 1.2 mg/dL    Total Protein 7.5 6.4 - 8.3 g/dL    Albumin 4.5 3.5 - 5.2 g/dL    Albumin/Globulin Ratio 1.5 1.0 - 2.5    GFR Non-African American >60 >60 mL/min    GFR African American >60 >60 mL/min    GFR Comment          GFR Staging NOT REPORTED        Patient counseled:     Patient given educational materials - see patientinstructions. Discussed use, benefit, and side effects of prescribed medications. All patient questions answered. Pt verbalized understanding. Instructed to continue current medications, diet and exercise. Patient agreed with treatment plan. Follow up as directed.      Electronically signed by DIANA Leyva CNP on 12/29/2022 at 11:57 AM

## 2023-07-16 NOTE — PROGRESS NOTES
7/16/23  Called 7/15/23 PM by PA.  Patient and granddaughter at bedside wants to sign out AMA.  They were counselled repeatedly 7/15/24 that cardiology wants heparin drip until 7/16 but choose to leave AMA. Subjective:  Conchita Duarte is in for continued evaluation and management. His chronic medical problems include the following; hypertension, anxiety and stress, low HDL and high triglycerides. History of hypertension he is currently on losartan/HCTZ we had added on the water pill portion as last office visit he feels that he's been doing well on the medication. Denies any adverse effects. He had a metabolic panel ordered which she has not yet completed. He denies chest pain or pressure or shortness of breath palpitations muscle cramping. He has his DOT physical next week he is concerned about his blood pressure being elevated he states it seems elevate when he is nervous or stressed. He has been checking his blood pressure and at home he states it has been within normal range. He's had a history of anxiety and stress we recently place him on Prozac low-dose he's been doing well on medication denies significant adverse effects. He feels that he's noted some of the change in his anxiety type symptoms. He feels that he may have some PTSD symptoms he is interested in possibly seeing a therapist in regards to this. He's had a history of low HDL and high triglycerides he has his lipid panel checked annually through his wife's insurance. He is taking a Fish oil supplement. Review of systems per HPI, otherwise negative. Allergies; medications; past medical, surgical, family, and social history; and problem list reviewed as indicated in this encounter. Objective:  Vitals: Blood pressure 130/78, pulse 67, height 6' (1.829 m), weight 218 lb (98.9 kg), SpO2 99 %. Constitutional: He is oriented to person, place, and time. He appears well-developed and well-nourished and in no acute distress. Cardiovascular: Normal rate and regular rhythm, no murmur, rub, or gallop    Pulmonary/Chest: Effort normal and breath sounds normal. No rales or wheezes. No chest retraction.   Extremities: no clubbing, cyanosis, or

## 2024-03-11 ENCOUNTER — TELEPHONE (OUTPATIENT)
Dept: GASTROENTEROLOGY | Age: 49
End: 2024-03-11

## 2024-03-11 NOTE — TELEPHONE ENCOUNTER
Writer notes pt last colon was 3/5/21 with [place] dx polyps and family hx of colon cancer, writer called pt to schedule colon, pt did not answer, writer LMOV requesting return call.    1st attempt; called and LVM for patient regarding colon screen referral.    2nd attempt; mailed colon screen letter to patient's home address.

## 2024-03-11 NOTE — TELEPHONE ENCOUNTER
Writer returned pt call regarding colon, pt last colon was 3/5/21 with 5 yr recall fx family history of colon cancer, I was incorrect in previous TE of 3 yrs. Pt states he is moving to [place] next year and wants to have repeat colon/EGD in the fall. Writer advised pt to call us in July to schedule, our surgery books do not go out that far, also when procedure is scheduled we will make sure precet is done and everything including anesthesia is covered, pt voices understanding.

## 2024-03-11 NOTE — TELEPHONE ENCOUNTER
Patient interested in having colonoscopy but patient stating Dr Moser and Aye are in network but was told [facility] anesthesiologist is out of network. Please call patient at [phone].

## 2024-09-09 ENCOUNTER — OFFICE VISIT (OUTPATIENT)
Dept: GASTROENTEROLOGY | Age: 49
End: 2024-09-09
Payer: COMMERCIAL

## 2024-09-09 VITALS
WEIGHT: 227 LBS | DIASTOLIC BLOOD PRESSURE: 95 MMHG | BODY MASS INDEX: 30.79 KG/M2 | HEART RATE: 77 BPM | OXYGEN SATURATION: 96 % | SYSTOLIC BLOOD PRESSURE: 138 MMHG | TEMPERATURE: 97.4 F | RESPIRATION RATE: 16 BRPM

## 2024-09-09 DIAGNOSIS — Z80.0 FAMILY HISTORY OF COLON CANCER IN MOTHER: ICD-10-CM

## 2024-09-09 DIAGNOSIS — D12.0 ADENOMA OF CECUM: ICD-10-CM

## 2024-09-09 DIAGNOSIS — K44.9 HIATAL HERNIA: ICD-10-CM

## 2024-09-09 DIAGNOSIS — D13.1 FUNDIC GLAND POLYPS OF STOMACH, BENIGN: ICD-10-CM

## 2024-09-09 DIAGNOSIS — K21.9 GASTROESOPHAGEAL REFLUX DISEASE WITHOUT ESOPHAGITIS: Primary | ICD-10-CM

## 2024-09-09 PROCEDURE — 3075F SYST BP GE 130 - 139MM HG: CPT | Performed by: PHYSICIAN ASSISTANT

## 2024-09-09 PROCEDURE — 99204 OFFICE O/P NEW MOD 45 MIN: CPT | Performed by: PHYSICIAN ASSISTANT

## 2024-09-09 PROCEDURE — 3080F DIAST BP >= 90 MM HG: CPT | Performed by: PHYSICIAN ASSISTANT

## 2024-09-09 ASSESSMENT — ENCOUNTER SYMPTOMS
BLOOD IN STOOL: 0
VOICE CHANGE: 0
WHEEZING: 0
COUGH: 0
ABDOMINAL PAIN: 0
TROUBLE SWALLOWING: 0
VOMITING: 0
CONSTIPATION: 0
RECTAL PAIN: 0
DIARRHEA: 0
CHOKING: 0
NAUSEA: 0
ANAL BLEEDING: 0
SORE THROAT: 0
ABDOMINAL DISTENTION: 0

## 2024-11-11 LAB — PROSTATE SPECIFIC ANTIGEN: 2.95 NG/ML

## (undated) DEVICE — BASIN EMSIS 700ML GRAPHITE PLAS KID SHP GRAD

## (undated) DEVICE — Device: Brand: DEFENDO VALVE AND CONNECTOR KIT

## (undated) DEVICE — CO2 CANNULA,SUPERSOFT, ADLT,7'O2,7'CO2: Brand: MEDLINE

## (undated) DEVICE — CUP MED 1OZ CLR POLYPR FEED GRAD W/O LID

## (undated) DEVICE — GOWN,AURORA,NONREINFORCED,LARGE: Brand: MEDLINE

## (undated) DEVICE — NO USE 18 MONTHS GOWN ISOL XL YEL LF

## (undated) DEVICE — ADAPTER TBNG LUER STUB 15 GA INTMED

## (undated) DEVICE — TUBING, SUCTION, 1/4" X 12', STRAIGHT: Brand: MEDLINE

## (undated) DEVICE — 60 ML SYRINGE LUER-LOCK TIP: Brand: MONOJECT

## (undated) DEVICE — SNARE ENDOSCP M L240CM LOOP W27MM SHTH DIA2.4MM OVL FLX

## (undated) DEVICE — BLOCK BITE 60FR RUBBER ADLT DENTAL

## (undated) DEVICE — ELECTRODE PT RET AD L9FT HI MOIST COND ADH HYDRGEL CORDED

## (undated) DEVICE — JELLY,LUBE,STERILE,FLIP TOP,TUBE,2-OZ: Brand: MEDLINE

## (undated) DEVICE — SOLUTION IV IRRIG 500ML 0.9% SODIUM CHL 2F7123

## (undated) DEVICE — MEDICINE CUP, GRADUATED, STER: Brand: MEDLINE

## (undated) DEVICE — SYRINGE MED 50ML LUERLOCK TIP

## (undated) DEVICE — TRAP SURG QUAD PARABOLA SLOT DSGN SFTY SCRN TRAPEASE